# Patient Record
Sex: MALE | Race: WHITE | NOT HISPANIC OR LATINO | Employment: FULL TIME | URBAN - METROPOLITAN AREA
[De-identification: names, ages, dates, MRNs, and addresses within clinical notes are randomized per-mention and may not be internally consistent; named-entity substitution may affect disease eponyms.]

---

## 2020-07-11 ENCOUNTER — HOSPITAL ENCOUNTER (INPATIENT)
Facility: HOSPITAL | Age: 50
LOS: 2 days | Discharge: HOME | DRG: 660 | End: 2020-07-13
Attending: INTERNAL MEDICINE | Admitting: INTERNAL MEDICINE
Payer: COMMERCIAL

## 2020-07-11 ENCOUNTER — HOSPITAL ENCOUNTER (EMERGENCY)
Facility: HOSPITAL | Age: 50
End: 2020-07-11
Attending: EMERGENCY MEDICINE | Admitting: EMERGENCY MEDICINE
Payer: COMMERCIAL

## 2020-07-11 ENCOUNTER — APPOINTMENT (EMERGENCY)
Dept: CT IMAGING | Facility: HOSPITAL | Age: 50
End: 2020-07-11
Payer: COMMERCIAL

## 2020-07-11 ENCOUNTER — APPOINTMENT (EMERGENCY)
Dept: ULTRASOUND IMAGING | Facility: HOSPITAL | Age: 50
End: 2020-07-11
Payer: COMMERCIAL

## 2020-07-11 VITALS
HEIGHT: 71 IN | TEMPERATURE: 97.5 F | BODY MASS INDEX: 41.3 KG/M2 | RESPIRATION RATE: 18 BRPM | OXYGEN SATURATION: 95 % | DIASTOLIC BLOOD PRESSURE: 82 MMHG | WEIGHT: 295 LBS | HEART RATE: 84 BPM | SYSTOLIC BLOOD PRESSURE: 164 MMHG

## 2020-07-11 DIAGNOSIS — I86.1 BILATERAL VARICOCELES: ICD-10-CM

## 2020-07-11 DIAGNOSIS — N20.0 NEPHROLITHIASIS: Primary | ICD-10-CM

## 2020-07-11 DIAGNOSIS — N20.0 NEPHROLITHIASIS: ICD-10-CM

## 2020-07-11 DIAGNOSIS — R31.9 HEMATURIA: ICD-10-CM

## 2020-07-11 DIAGNOSIS — D72.829 LEUKOCYTOSIS, UNSPECIFIED TYPE: ICD-10-CM

## 2020-07-11 DIAGNOSIS — R10.9 LEFT FLANK PAIN: Primary | ICD-10-CM

## 2020-07-11 DIAGNOSIS — R11.2 NON-INTRACTABLE VOMITING WITH NAUSEA, UNSPECIFIED VOMITING TYPE: ICD-10-CM

## 2020-07-11 DIAGNOSIS — R79.89 ELEVATED LACTIC ACID LEVEL: ICD-10-CM

## 2020-07-11 DIAGNOSIS — R16.0 HEPATOMEGALY: ICD-10-CM

## 2020-07-11 DIAGNOSIS — N50.812 LEFT TESTICULAR PAIN: ICD-10-CM

## 2020-07-11 DIAGNOSIS — R74.01 ELEVATED TRANSAMINASE LEVEL: ICD-10-CM

## 2020-07-11 PROBLEM — F11.21 OPIOID DEPENDENCE IN REMISSION (HCC): Status: ACTIVE | Noted: 2020-07-11

## 2020-07-11 PROBLEM — I16.0 HYPERTENSIVE URGENCY: Status: ACTIVE | Noted: 2020-07-11

## 2020-07-11 PROBLEM — N17.9 ACUTE KIDNEY INJURY (HCC): Status: ACTIVE | Noted: 2020-07-11

## 2020-07-11 LAB
ALBUMIN SERPL BCP-MCNC: 4.1 G/DL (ref 3.5–5)
ALP SERPL-CCNC: 165 U/L (ref 46–116)
ALT SERPL W P-5'-P-CCNC: 83 U/L (ref 12–78)
ANION GAP SERPL CALCULATED.3IONS-SCNC: 11 MMOL/L (ref 4–13)
AST SERPL W P-5'-P-CCNC: 60 U/L (ref 5–45)
BACTERIA UR QL AUTO: ABNORMAL /HPF
BASOPHILS # BLD AUTO: 0.03 THOUSANDS/ΜL (ref 0–0.1)
BASOPHILS NFR BLD AUTO: 0 % (ref 0–1)
BILIRUB SERPL-MCNC: 0.51 MG/DL (ref 0.2–1)
BILIRUB UR QL STRIP: NEGATIVE
BUN SERPL-MCNC: 18 MG/DL (ref 5–25)
CALCIUM SERPL-MCNC: 8 MG/DL (ref 8.3–10.1)
CHLORIDE SERPL-SCNC: 103 MMOL/L (ref 100–108)
CLARITY UR: CLEAR
CO2 SERPL-SCNC: 26 MMOL/L (ref 21–32)
COLOR UR: ABNORMAL
CREAT SERPL-MCNC: 1.39 MG/DL (ref 0.6–1.3)
EOSINOPHIL # BLD AUTO: 0.03 THOUSAND/ΜL (ref 0–0.61)
EOSINOPHIL NFR BLD AUTO: 0 % (ref 0–6)
ERYTHROCYTE [DISTWIDTH] IN BLOOD BY AUTOMATED COUNT: 13.3 % (ref 11.6–15.1)
GFR SERPL CREATININE-BSD FRML MDRD: 60 ML/MIN/1.73SQ M
GLUCOSE SERPL-MCNC: 176 MG/DL (ref 65–140)
GLUCOSE UR STRIP-MCNC: ABNORMAL MG/DL
HCT VFR BLD AUTO: 42.3 % (ref 36.5–49.3)
HGB BLD-MCNC: 14 G/DL (ref 12–17)
HGB UR QL STRIP.AUTO: ABNORMAL
IMM GRANULOCYTES # BLD AUTO: 0.04 THOUSAND/UL (ref 0–0.2)
IMM GRANULOCYTES NFR BLD AUTO: 0 % (ref 0–2)
KETONES UR STRIP-MCNC: NEGATIVE MG/DL
LACTATE SERPL-SCNC: 2.5 MMOL/L (ref 0.5–2)
LEUKOCYTE ESTERASE UR QL STRIP: NEGATIVE
LIPASE SERPL-CCNC: 109 U/L (ref 73–393)
LYMPHOCYTES # BLD AUTO: 0.86 THOUSANDS/ΜL (ref 0.6–4.47)
LYMPHOCYTES NFR BLD AUTO: 8 % (ref 14–44)
MAGNESIUM SERPL-MCNC: 1.9 MG/DL (ref 1.6–2.6)
MCH RBC QN AUTO: 26.5 PG (ref 26.8–34.3)
MCHC RBC AUTO-ENTMCNC: 33.1 G/DL (ref 31.4–37.4)
MCV RBC AUTO: 80 FL (ref 82–98)
MONOCYTES # BLD AUTO: 0.83 THOUSAND/ΜL (ref 0.17–1.22)
MONOCYTES NFR BLD AUTO: 8 % (ref 4–12)
NEUTROPHILS # BLD AUTO: 8.91 THOUSANDS/ΜL (ref 1.85–7.62)
NEUTS SEG NFR BLD AUTO: 84 % (ref 43–75)
NITRITE UR QL STRIP: NEGATIVE
NON-SQ EPI CELLS URNS QL MICRO: ABNORMAL /HPF
NRBC BLD AUTO-RTO: 0 /100 WBCS
PH UR STRIP.AUTO: 7 [PH]
PLATELET # BLD AUTO: 215 THOUSANDS/UL (ref 149–390)
PMV BLD AUTO: 9.1 FL (ref 8.9–12.7)
POTASSIUM SERPL-SCNC: 4.1 MMOL/L (ref 3.5–5.3)
PROT SERPL-MCNC: 7.4 G/DL (ref 6.4–8.2)
PROT UR STRIP-MCNC: NEGATIVE MG/DL
RBC # BLD AUTO: 5.28 MILLION/UL (ref 3.88–5.62)
RBC #/AREA URNS AUTO: ABNORMAL /HPF
SODIUM SERPL-SCNC: 140 MMOL/L (ref 136–145)
SP GR UR STRIP.AUTO: 1.02 (ref 1–1.03)
UROBILINOGEN UR QL STRIP.AUTO: 0.2 E.U./DL
WBC # BLD AUTO: 10.7 THOUSAND/UL (ref 4.31–10.16)
WBC #/AREA URNS AUTO: ABNORMAL /HPF

## 2020-07-11 PROCEDURE — 36415 COLL VENOUS BLD VENIPUNCTURE: CPT | Performed by: EMERGENCY MEDICINE

## 2020-07-11 PROCEDURE — 96365 THER/PROPH/DIAG IV INF INIT: CPT

## 2020-07-11 PROCEDURE — G0379 DIRECT REFER HOSPITAL OBSERV: HCPCS

## 2020-07-11 PROCEDURE — 83735 ASSAY OF MAGNESIUM: CPT | Performed by: EMERGENCY MEDICINE

## 2020-07-11 PROCEDURE — 76870 US EXAM SCROTUM: CPT

## 2020-07-11 PROCEDURE — 99285 EMERGENCY DEPT VISIT HI MDM: CPT

## 2020-07-11 PROCEDURE — 99220 PR INITIAL OBSERVATION CARE/DAY 70 MINUTES: CPT | Performed by: INTERNAL MEDICINE

## 2020-07-11 PROCEDURE — 96361 HYDRATE IV INFUSION ADD-ON: CPT

## 2020-07-11 PROCEDURE — 83605 ASSAY OF LACTIC ACID: CPT | Performed by: EMERGENCY MEDICINE

## 2020-07-11 PROCEDURE — 99285 EMERGENCY DEPT VISIT HI MDM: CPT | Performed by: EMERGENCY MEDICINE

## 2020-07-11 PROCEDURE — 80053 COMPREHEN METABOLIC PANEL: CPT | Performed by: EMERGENCY MEDICINE

## 2020-07-11 PROCEDURE — 83690 ASSAY OF LIPASE: CPT | Performed by: EMERGENCY MEDICINE

## 2020-07-11 PROCEDURE — 74177 CT ABD & PELVIS W/CONTRAST: CPT

## 2020-07-11 PROCEDURE — 85025 COMPLETE CBC W/AUTO DIFF WBC: CPT | Performed by: EMERGENCY MEDICINE

## 2020-07-11 PROCEDURE — 81001 URINALYSIS AUTO W/SCOPE: CPT | Performed by: EMERGENCY MEDICINE

## 2020-07-11 PROCEDURE — 96375 TX/PRO/DX INJ NEW DRUG ADDON: CPT

## 2020-07-11 RX ORDER — ONDANSETRON 4 MG/1
4 TABLET, FILM COATED ORAL EVERY 6 HOURS
Qty: 12 TABLET | Refills: 0 | Status: CANCELLED | OUTPATIENT
Start: 2020-07-11

## 2020-07-11 RX ORDER — TAMSULOSIN HYDROCHLORIDE 0.4 MG/1
0.4 CAPSULE ORAL
Status: DISCONTINUED | OUTPATIENT
Start: 2020-07-11 | End: 2020-07-13 | Stop reason: HOSPADM

## 2020-07-11 RX ORDER — SODIUM CHLORIDE 9 MG/ML
200 INJECTION, SOLUTION INTRAVENOUS CONTINUOUS
Status: DISCONTINUED | OUTPATIENT
Start: 2020-07-11 | End: 2020-07-12

## 2020-07-11 RX ORDER — KETOROLAC TROMETHAMINE 30 MG/ML
15 INJECTION, SOLUTION INTRAMUSCULAR; INTRAVENOUS EVERY 6 HOURS PRN
Status: DISCONTINUED | OUTPATIENT
Start: 2020-07-11 | End: 2020-07-12

## 2020-07-11 RX ORDER — BUPRENORPHINE AND NALOXONE 8; 2 MG/1; MG/1
1 FILM, SOLUBLE BUCCAL; SUBLINGUAL DAILY
Status: DISCONTINUED | OUTPATIENT
Start: 2020-07-12 | End: 2020-07-13 | Stop reason: HOSPADM

## 2020-07-11 RX ORDER — ACETAMINOPHEN 325 MG/1
650 TABLET ORAL ONCE
Status: COMPLETED | OUTPATIENT
Start: 2020-07-11 | End: 2020-07-11

## 2020-07-11 RX ORDER — KETOROLAC TROMETHAMINE 30 MG/ML
15 INJECTION, SOLUTION INTRAMUSCULAR; INTRAVENOUS EVERY 6 HOURS SCHEDULED
Status: DISCONTINUED | OUTPATIENT
Start: 2020-07-11 | End: 2020-07-11

## 2020-07-11 RX ORDER — ACETAMINOPHEN 325 MG/1
650 TABLET ORAL EVERY 6 HOURS SCHEDULED
Status: DISCONTINUED | OUTPATIENT
Start: 2020-07-11 | End: 2020-07-13 | Stop reason: HOSPADM

## 2020-07-11 RX ORDER — KETOROLAC TROMETHAMINE 30 MG/ML
30 INJECTION, SOLUTION INTRAMUSCULAR; INTRAVENOUS ONCE
Status: COMPLETED | OUTPATIENT
Start: 2020-07-11 | End: 2020-07-11

## 2020-07-11 RX ORDER — PANTOPRAZOLE SODIUM 40 MG/1
40 TABLET, DELAYED RELEASE ORAL DAILY
COMMUNITY

## 2020-07-11 RX ORDER — CEFTRIAXONE 1 G/50ML
1000 INJECTION, SOLUTION INTRAVENOUS ONCE
Status: COMPLETED | OUTPATIENT
Start: 2020-07-11 | End: 2020-07-11

## 2020-07-11 RX ORDER — BUPRENORPHINE HYDROCHLORIDE AND NALOXONE HYDROCHLORIDE DIHYDRATE 8; 2 MG/1; MG/1
1 TABLET SUBLINGUAL DAILY
COMMUNITY

## 2020-07-11 RX ORDER — HYDROMORPHONE HCL/PF 1 MG/ML
1 SYRINGE (ML) INJECTION ONCE
Status: DISCONTINUED | OUTPATIENT
Start: 2020-07-11 | End: 2020-07-11 | Stop reason: HOSPADM

## 2020-07-11 RX ORDER — LABETALOL 20 MG/4 ML (5 MG/ML) INTRAVENOUS SYRINGE
10 ONCE
Status: COMPLETED | OUTPATIENT
Start: 2020-07-11 | End: 2020-07-11

## 2020-07-11 RX ORDER — ONDANSETRON 2 MG/ML
4 INJECTION INTRAMUSCULAR; INTRAVENOUS ONCE
Status: COMPLETED | OUTPATIENT
Start: 2020-07-11 | End: 2020-07-11

## 2020-07-11 RX ORDER — SODIUM CHLORIDE 9 MG/ML
150 INJECTION, SOLUTION INTRAVENOUS CONTINUOUS
Status: DISCONTINUED | OUTPATIENT
Start: 2020-07-11 | End: 2020-07-11

## 2020-07-11 RX ORDER — HYDRALAZINE HYDROCHLORIDE 20 MG/ML
10 INJECTION INTRAMUSCULAR; INTRAVENOUS ONCE
Status: DISCONTINUED | OUTPATIENT
Start: 2020-07-11 | End: 2020-07-11 | Stop reason: HOSPADM

## 2020-07-11 RX ORDER — TAMSULOSIN HYDROCHLORIDE 0.4 MG/1
0.4 CAPSULE ORAL ONCE
Status: COMPLETED | OUTPATIENT
Start: 2020-07-11 | End: 2020-07-11

## 2020-07-11 RX ORDER — PANTOPRAZOLE SODIUM 40 MG/1
40 TABLET, DELAYED RELEASE ORAL DAILY
Status: DISCONTINUED | OUTPATIENT
Start: 2020-07-12 | End: 2020-07-13 | Stop reason: HOSPADM

## 2020-07-11 RX ORDER — MORPHINE SULFATE 4 MG/ML
4 INJECTION, SOLUTION INTRAMUSCULAR; INTRAVENOUS ONCE
Status: DISCONTINUED | OUTPATIENT
Start: 2020-07-11 | End: 2020-07-11 | Stop reason: HOSPADM

## 2020-07-11 RX ORDER — ECHINACEA PURPUREA EXTRACT 125 MG
1 TABLET ORAL
Status: DISCONTINUED | OUTPATIENT
Start: 2020-07-11 | End: 2020-07-13 | Stop reason: HOSPADM

## 2020-07-11 RX ADMIN — ACETAMINOPHEN 650 MG: 325 TABLET, FILM COATED ORAL at 18:04

## 2020-07-11 RX ADMIN — TAMSULOSIN HYDROCHLORIDE 0.4 MG: 0.4 CAPSULE ORAL at 18:04

## 2020-07-11 RX ADMIN — IOHEXOL 100 ML: 350 INJECTION, SOLUTION INTRAVENOUS at 10:32

## 2020-07-11 RX ADMIN — ACETAMINOPHEN 650 MG: 325 TABLET ORAL at 11:10

## 2020-07-11 RX ADMIN — CEFTRIAXONE 1000 MG: 1 INJECTION, SOLUTION INTRAVENOUS at 11:11

## 2020-07-11 RX ADMIN — KETOROLAC TROMETHAMINE 30 MG: 30 INJECTION, SOLUTION INTRAMUSCULAR at 18:03

## 2020-07-11 RX ADMIN — SODIUM CHLORIDE 1000 ML: 0.9 INJECTION, SOLUTION INTRAVENOUS at 09:43

## 2020-07-11 RX ADMIN — LABETALOL 20 MG/4 ML (5 MG/ML) INTRAVENOUS SYRINGE 10 MG: at 11:28

## 2020-07-11 RX ADMIN — TAMSULOSIN HYDROCHLORIDE 0.4 MG: 0.4 CAPSULE ORAL at 11:10

## 2020-07-11 RX ADMIN — ONDANSETRON 4 MG: 2 INJECTION INTRAMUSCULAR; INTRAVENOUS at 09:50

## 2020-07-11 RX ADMIN — KETOROLAC TROMETHAMINE 30 MG: 30 INJECTION, SOLUTION INTRAMUSCULAR at 09:51

## 2020-07-11 RX ADMIN — SODIUM CHLORIDE 1000 ML: 0.9 INJECTION, SOLUTION INTRAVENOUS at 11:31

## 2020-07-11 RX ADMIN — SODIUM CHLORIDE 150 ML/HR: 0.9 INJECTION, SOLUTION INTRAVENOUS at 16:33

## 2020-07-11 NOTE — ASSESSMENT & PLAN NOTE
Suspect pain related  Patient presented ED with blood pressure 207/113 with pain med improved to 164/82  He was on lisinopril in the past which was discontinued because his blood pressure was well controlled  Continue to monitor

## 2020-07-11 NOTE — ASSESSMENT & PLAN NOTE
AST 60, ALT 83, phos 65  CT showed hepatomegaly with fatty infiltration  Patient denies right upper pain

## 2020-07-11 NOTE — PLAN OF CARE
Problem: PAIN - ADULT  Goal: Verbalizes/displays adequate comfort level or baseline comfort level  Description  Interventions:  - Encourage patient to monitor pain and request assistance  - Assess pain using appropriate pain scale  - Administer analgesics based on type and severity of pain and evaluate response  - Implement non-pharmacological measures as appropriate and evaluate response  - Consider cultural and social influences on pain and pain management  - Notify physician/advanced practitioner if interventions unsuccessful or patient reports new pain  Outcome: Progressing     Problem: INFECTION - ADULT  Goal: Absence or prevention of progression during hospitalization  Description  INTERVENTIONS:  - Assess and monitor for signs and symptoms of infection  - Monitor lab/diagnostic results  - Monitor all insertion sites, i e  indwelling lines, tubes, and drains  - Monitor endotracheal if appropriate and nasal secretions for changes in amount and color  - Riggins appropriate cooling/warming therapies per order  - Administer medications as ordered  - Instruct and encourage patient and family to use good hand hygiene technique  - Identify and instruct in appropriate isolation precautions for identified infection/condition  Outcome: Progressing  Goal: Absence of fever/infection during neutropenic period  Description  INTERVENTIONS:  - Monitor WBC    Outcome: Progressing     Problem: SAFETY ADULT  Goal: Patient will remain free of falls  Description  INTERVENTIONS:  - Assess patient frequently for physical needs  -  Identify cognitive and physical deficits and behaviors that affect risk of falls    -  Riggins fall precautions as indicated by assessment   - Educate patient/family on patient safety including physical limitations  - Instruct patient to call for assistance with activity based on assessment  - Modify environment to reduce risk of injury  - Consider OT/PT consult to assist with strengthening/mobility  Outcome: Progressing  Goal: Maintain or return to baseline ADL function  Description  INTERVENTIONS:  -  Assess patient's ability to carry out ADLs; assess patient's baseline for ADL function and identify physical deficits which impact ability to perform ADLs (bathing, care of mouth/teeth, toileting, grooming, dressing, etc )  - Assess/evaluate cause of self-care deficits   - Assess range of motion  - Assess patient's mobility; develop plan if impaired  - Assess patient's need for assistive devices and provide as appropriate  - Encourage maximum independence but intervene and supervise when necessary  - Involve family in performance of ADLs  - Assess for home care needs following discharge   - Consider OT consult to assist with ADL evaluation and planning for discharge  - Provide patient education as appropriate  Outcome: Progressing  Goal: Maintain or return mobility status to optimal level  Description  INTERVENTIONS:  - Assess patient's baseline mobility status (ambulation, transfers, stairs, etc )    - Identify cognitive and physical deficits and behaviors that affect mobility  - Identify mobility aids required to assist with transfers and/or ambulation (gait belt, sit-to-stand, lift, walker, cane, etc )  - [place] fall precautions as indicated by assessment  - Record patient progress and toleration of activity level on Mobility SBAR; progress patient to next Phase/Stage  - Instruct patient to call for assistance with activity based on assessment  - Consider rehabilitation consult to assist with strengthening/weightbearing, etc   Outcome: Progressing     Problem: DISCHARGE PLANNING  Goal: Discharge to home or other facility with appropriate resources  Description  INTERVENTIONS:  - Identify barriers to discharge w/patient and caregiver  - Arrange for needed discharge resources and transportation as appropriate  - Identify discharge learning needs (meds, wound care, etc )  - Arrange for interpretive services to assist at discharge as needed  - Refer to Case Management Department for coordinating discharge planning if the patient needs post-hospital services based on physician/advanced practitioner order or complex needs related to functional status, cognitive ability, or social support system  Outcome: Progressing     Problem: Knowledge Deficit  Goal: Patient/family/caregiver demonstrates understanding of disease process, treatment plan, medications, and discharge instructions  Description  Complete learning assessment and assess knowledge base    Interventions:  - Provide teaching at level of understanding  - Provide teaching via preferred learning methods  Outcome: Progressing

## 2020-07-11 NOTE — ASSESSMENT & PLAN NOTE
Patient has Suboxone, does not want a stop taking it  Refuse opioids  Requested to give a call to Dr Rhonda Phillips [phone] with managing Suboxone to discuss pain management    I left a voicemail

## 2020-07-11 NOTE — H&P
H&P- Enolia Pert 1970, 51 y o  male MRN: [id number]    Unit/Bed#: MS Dow7-01 Encounter: [id number]    Primary Care Provider: Mavis Art MD   Date and time admitted to hospital: 7/11/2020  2:47 PM        * Nephrolithiasis  Assessment & Plan  Patient presented with left groin pain that radiated to left flank pain  Transferred from [address] [place] [address] for urology evaluation  CT showed 2 adjacent calculi measuring 3 mm and 2 mm in the left distal ureter causing mild hydroureteronephrosis with perinephric stranding  Tiny nonobstructing right intrarenal calculus  Scrotal ultrasound done due to left scrotal pain  Normal testes, bilateral varicoceles  UA negative for leukocytes, nitrate or bacteria, showed trace blood  Patient afebrile, white blood cell count 10  IV fluids  Tamsulosin  Patient on Suboxone and would not like to come off of it  Refuses opioids  Tylenol  Toradol 15 mg q 6 p r n , cautious due to acute kidney failure  Urology consult        Acute kidney injury [facility])  Assessment & Plan  Creatinine 1 39, in Care everywhere last creatinine level was were 0 89  Start IV fluids  Patient refuses opioids, for pain management started on Toradol q 6 hours p r n  He also received contrast  Repeat BMP in the morning    Opioid dependence in remission [facility])  Assessment & Plan  Patient has Suboxone, does not want a stop taking it  Refuse opioids  Requested to give a call to Dr Vinnie Libman [phone] with managing Suboxone to discuss pain management    I left a voicemail    Hypertensive urgency  Assessment & Plan  Suspect pain related  Patient presented ED with blood pressure 207/113 with pain med improved to 164/82  He was on lisinopril in the past which was discontinued because his blood pressure was well controlled  Continue to monitor    Transaminitis  Assessment & Plan  AST 60, ALT 83, phos 65  CT showed hepatomegaly with fatty infiltration  Patient denies right upper pain    VTE Prophylaxis: Patient ambulating / sequential compression device   Code Status:  Full code  POLST: There is no POLST form on file for this patient (pre-hospital)  Discussion with family:  Patient    Anticipated Length of Stay:  Patient will be admitted on an Observation basis with an anticipated length of stay of  less than 2 midnights  Justification for Hospital Stay:  Nephrolithiasis, needs urology evaluation    Total Time for Visit, including Counseling / Coordination of Care: 45 minutes  Greater than 50% of this total time spent on direct patient counseling and coordination of care  Chief Complaint:   Left flank pain    History of Present Illness:    Nehemias Erickson is a 51 y o  male who presents with left flank pain  Patient is a 46-year-old male with a past medical history of GERD, opiate dependence currently on Suboxone  Tuesday patient developed hematuria, called his PCP who said he is most likely dehydrated because he was working outside a lot  He was drinking more fluids and hematuria resolved  On Friday he developed left growing pain which was radiating in the left flank, sharp intermittent, rated 10/10, associated with difficulty with urination  He presented to [address] [place] [address] ED found to have on CT 2 adjacent calculi measuring 3 mm and 2 mm in the left distal ureter causing mild hydroureteronephrosis  He was transferred to [place] for urology evaluation  Patient is on Suboxone, refusing to stop Suboxone  Refuses opioids  Denies fever, chills, nausea vomiting diarrhea constipation, chest pain shortness of breath or palpitation  Review of Systems:    Review of Systems all reviewed and negative except as above    Past Medical and Surgical History:     Past Medical History:   Diagnosis Date    Back pain     Chronic GERD        Patient had low back surgery and neck surgery in the past  Family history noncontributory    Meds/Allergies:    Prior to Admission medications    Medication Sig Start Date End Date Taking?  Authorizing Provider   buprenorphine-naloxone (SUBOXONE) 8-2 mg per SL tablet Place 1 tablet under the tongue daily   Yes Historical Provider, MD   pantoprazole (PROTONIX) 40 mg tablet Take 40 mg by mouth daily   Yes Historical Provider, MD     I have reviewed home medications with patient personally  Allergies: No Known Allergies    Social History:     Marital Status: Single   Substance Use History:   Social History     Substance and Sexual Activity   Alcohol Use Never    Frequency: Never     Social History     Tobacco Use   Smoking Status Unknown If Ever Smoked   Smokeless Tobacco Never Used     Social History     Substance and Sexual Activity   Drug Use Never       Family History:    Denies any family history    Physical Exam:     Vitals:        Physical Exam   Constitutional: He is oriented to person, place, and time  Obese, no acute distress   HENT:   Head: Atraumatic  Eyes: EOM are normal    Neck: Neck supple  Cardiovascular: Normal rate, regular rhythm and normal heart sounds  Exam reveals no friction rub  No murmur heard  Pulmonary/Chest: Effort normal and breath sounds normal  No respiratory distress  He has no wheezes  Abdominal: Soft  Bowel sounds are normal  He exhibits no distension  Left groin tenderness, no left CVA tenderness   Musculoskeletal: He exhibits no edema  Neurological: He is alert and oriented to person, place, and time  No cranial nerve deficit  Skin: Skin is warm and dry  Psychiatric: His behavior is normal        Additional Data:     Lab Results: I have personally reviewed pertinent reports        Results from last 7 days   Lab Units 07/11/20  0942   WBC Thousand/uL 10 70*   HEMOGLOBIN g/dL 14 0   HEMATOCRIT % 42 3   PLATELETS Thousands/uL 215   NEUTROS PCT % 84*   LYMPHS PCT % 8*   MONOS PCT % 8   EOS PCT % 0     Results from last 7 days   Lab Units 07/11/20  0942   SODIUM mmol/L 140   POTASSIUM mmol/L 4 1   CHLORIDE mmol/L 103   CO2 mmol/L 26   BUN mg/dL 18   CREATININE mg/dL 1 39*   ANION GAP mmol/L 11   CALCIUM mg/dL 8 0*   ALBUMIN g/dL 4 1   TOTAL BILIRUBIN mg/dL 0 51   ALK PHOS U/L 165*   ALT U/L 83*   AST U/L 60*   GLUCOSE RANDOM mg/dL 176*                 Results from last 7 days   Lab Units 07/11/20  0942   LACTIC ACID mmol/L 2 5*       Imaging: I have personally reviewed pertinent reports  No orders to display       EKG, Pathology, and Other Studies Reviewed on Admission:   · EKG:  Not done    [facility]     ** Please Note: This note has been constructed using a voice recognition system   **

## 2020-07-11 NOTE — ED NOTES
Report to [facility]  Ems here for transport  Pt still declining narcotics, as he is trying to contact his "suboxone doctor" in [place] prior to accepting any pain meds        Ann Jeff RN  07/11/20 [id number]

## 2020-07-11 NOTE — ASSESSMENT & PLAN NOTE
Creatinine 1 39, in Care everywhere last creatinine level was were 0 89  Start IV fluids  Patient refuses opioids, for pain management started on Toradol q 6 hours p r n    He also received contrast  Repeat BMP in the morning

## 2020-07-11 NOTE — ED PROVIDER NOTES
History  Chief Complaint   Patient presents with    Abdominal Pain     LLQ pain radiating to L groin  Urinary frequency  Onset yest     60-year-old male with a past medical history of GERD, chronic low back pain status post L5-S1 fusion presents with left flank pain with associated nausea and vomiting since last night  Patient states his pain is primarily in the left flank and radiates to the left low back and to the left lower quadrant and into the left testicle  Patient has never had pain like this before and denies history of nephrolithiasis, hernia, diverticulitis or pyelonephritis  Patient denies history of AAA  Denies trauma, fall or exertional activity  Patient had one episode of nonbloody nonbilious emesis this morning  Patient is on Suboxone due to past oxycodone addiction and did not take any pain medication at home for his symptoms  Patient denies fever, chills, cough, sputum production, headache, rash, chest pain, shortness of breath, bloody stools, urinary symptoms, diarrhea, constipation, penile discharge  Patient denies history of STDs, cancer, IV drug use  Patient denies saddle anesthesia, urinary retention, urinary fecal incontinence  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 wore PPE during clinical evaluation including Bonnet, eye goggles, face mask and gloves        History provided by:  Patient   used: No    Flank Pain   Pain location:  L flank  Pain quality: pressure and stabbing    Pain radiates to:  Scrotum, LLQ and back  Pain severity:  Severe  Onset quality:  Gradual  Duration:  14 hours  Timing:  Intermittent  Progression:  Waxing and waning  Chronicity:  New  Context: awakening from sleep    Context: not alcohol use, not diet changes, not eating, not laxative use, not medication withdrawal, not previous surgeries, not recent illness, not recent sexual activity, not recent travel, not retching, not sick contacts, not suspicious food intake and not trauma    Relieved by:  Nothing  Worsened by:  Nothing  Ineffective treatments:  None tried  Associated symptoms: nausea and vomiting    Associated symptoms: no anorexia, no belching, no chest pain, no chills, no constipation, no cough, no diarrhea, no dysuria, no fatigue, no fever, no flatus, no hematemesis, no hematochezia, no hematuria, no melena, no shortness of breath and no sore throat    Risk factors: obesity    Risk factors: no alcohol abuse, no aspirin use, not elderly, has not had multiple surgeries, no NSAID use and no recent hospitalization        Prior to Admission Medications   Prescriptions Last Dose Informant Patient Reported? Taking? buprenorphine-naloxone (SUBOXONE) 8-2 mg per SL tablet  Self Yes Yes   Sig: Place 1 tablet under the tongue daily   pantoprazole (PROTONIX) 40 mg tablet  Self Yes Yes   Sig: Take 40 mg by mouth daily      Facility-Administered Medications: None       Past Medical History:   Diagnosis Date    Back pain     Chronic GERD        History reviewed  No pertinent surgical history  No family history on file  I have reviewed and agree with the history as documented  E-Cigarette/Vaping     E-Cigarette/Vaping Substances     Social History     Tobacco Use    Smoking status: Unknown If Ever Smoked    Smokeless tobacco: Never Used   Substance Use Topics    Alcohol use: Never     Frequency: Never    Drug use: Never       Review of Systems   Constitutional: Negative for chills, diaphoresis, fatigue and fever  HENT: Negative for congestion, drooling, facial swelling, nosebleeds, sneezing, sore throat, trouble swallowing and voice change  Eyes: Negative for photophobia, pain and visual disturbance  Respiratory: Negative for cough, chest tightness, shortness of breath and wheezing  Cardiovascular: Negative for chest pain, palpitations and leg swelling  Gastrointestinal: Positive for abdominal pain, nausea and vomiting   Negative for anorexia, constipation, diarrhea, flatus, hematemesis, hematochezia and melena  Genitourinary: Positive for flank pain and testicular pain  Negative for decreased urine volume, difficulty urinating, discharge, dysuria, frequency, genital sores, hematuria, penile pain, penile swelling, scrotal swelling and urgency  Musculoskeletal: Negative for arthralgias, back pain, joint swelling, myalgias, neck pain and neck stiffness  Skin: Negative for color change, pallor, rash and wound  Allergic/Immunologic: Negative for immunocompromised state  Neurological: Negative for dizziness, tremors, seizures, syncope, facial asymmetry, speech difficulty, weakness, light-headedness, numbness and headaches  Hematological: Negative for adenopathy  Psychiatric/Behavioral: Negative for agitation, confusion, hallucinations and suicidal ideas  The patient is not nervous/anxious  Physical Exam  Physical Exam   Constitutional: He is oriented to person, place, and time  He appears well-developed and well-nourished  He is cooperative  Non-toxic appearance  He does not have a sickly appearance  He appears ill  No distress  Appears uncomfortable   HENT:   Head: Normocephalic and atraumatic  Right Ear: Hearing, tympanic membrane, external ear and ear canal normal    Left Ear: Hearing, tympanic membrane, external ear and ear canal normal    Nose: Nose normal  Right sinus exhibits no maxillary sinus tenderness and no frontal sinus tenderness  Left sinus exhibits no maxillary sinus tenderness and no frontal sinus tenderness  Mouth/Throat: Uvula is midline, oropharynx is clear and moist and mucous membranes are normal  No oropharyngeal exudate, posterior oropharyngeal edema, posterior oropharyngeal erythema or tonsillar abscesses  Eyes: Pupils are equal, round, and reactive to light  Conjunctivae, EOM and lids are normal    Neck: Trachea normal, normal range of motion, full passive range of motion without pain and phonation normal  Neck supple  No thyroid mass and no thyromegaly present  Cardiovascular: Normal rate, regular rhythm, S1 normal, S2 normal, normal heart sounds, intact distal pulses and normal pulses  Pulses:       Radial pulses are 2+ on the right side, and 2+ on the left side  Dorsalis pedis pulses are 2+ on the right side, and 2+ on the left side  Pulmonary/Chest: Effort normal and breath sounds normal  No accessory muscle usage or stridor  No tachypnea  No respiratory distress  He has no decreased breath sounds  He has no wheezes  He has no rhonchi  He has no rales  He exhibits no mass, no tenderness, no deformity and no retraction  Abdominal: Soft  Bowel sounds are normal  He exhibits no distension, no ascites and no mass  There is no hepatosplenomegaly, splenomegaly or hepatomegaly  There is tenderness in the suprapubic area and left lower quadrant  There is CVA tenderness (Left-sided)  There is no rigidity, no rebound, no guarding, no tenderness at McBurney's point and negative Bustillo's sign  No hernia  Hernia confirmed negative in the ventral area, confirmed negative in the right inguinal area and confirmed negative in the left inguinal area  Genitourinary: Penis normal  Cremasteric reflex is present  Right testis shows no mass, no swelling and no tenderness  Right testis is descended  Cremasteric reflex is not absent on the right side  Left testis shows tenderness  Left testis shows no mass and no swelling  Left testis is descended  Cremasteric reflex is not absent on the left side  Circumcised  No phimosis, paraphimosis, hypospadias, penile erythema or penile tenderness  No discharge found  Genitourinary Comments: Mild tenderness to left testicle; no swelling or erythema; normal lie of testicles bilaterally, positive cremasteric reflex   Musculoskeletal: Normal range of motion  He exhibits no edema, tenderness or deformity  Lymphadenopathy:     He has no cervical adenopathy  No inguinal adenopathy noted on the right or left side     Neurological: He is alert and oriented to person, place, and time  He has normal strength  He is not disoriented  He displays no atrophy and no tremor  No cranial nerve deficit or sensory deficit  He exhibits normal muscle tone  He displays a negative Romberg sign  He displays no seizure activity  Coordination and gait normal  GCS eye subscore is 4  GCS verbal subscore is 5  GCS motor subscore is 6  Patient is AAOx4, GCS 15; speaking clearly and appropriately; motor and sensation intact; visual fields intact; cranial nerves II-XII grossly intact; no facial droop, slurred speech or arm drift   Skin: Skin is warm and intact  Capillary refill takes less than 2 seconds  No abrasion, no bruising, no burn, no ecchymosis, no laceration, no lesion, no petechiae and no rash noted  Rash is not maculopapular  He is diaphoretic  No cyanosis or erythema  No pallor  Psychiatric: His speech is normal and behavior is normal  Judgment and thought content normal  His mood appears anxious  He is not actively hallucinating  Cognition and memory are normal  He is attentive  Nursing note and vitals reviewed        Vital Signs  ED Triage Vitals [07/11/20 0940]   Temperature Pulse Respirations Blood Pressure SpO2   97 5 °F (36 4 °C) 73 20 (!) 207/113 100 %      Temp Source Heart Rate Source Patient Position - Orthostatic VS BP Location FiO2 (%)   Temporal Monitor -- -- --      Pain Score       Worst Possible Pain           Vitals:    07/11/20 1115 07/11/20 1130 07/11/20 1157 07/11/20 1300   BP: (!) 187/104 (!) 194/100 166/86 164/82   Pulse: 82 65 80 84         Visual Acuity      ED Medications  Medications   ondansetron (ZOFRAN) injection 4 mg (4 mg Intravenous Given 7/11/20 0950)   ketorolac (TORADOL) injection 30 mg (30 mg Intravenous Given 7/11/20 0951)   sodium chloride 0 9 % bolus 1,000 mL (0 mL Intravenous Stopped 7/11/20 1315)   iohexol (OMNIPAQUE) 350 MG/ML injection (SINGLE-DOSE) 100 mL (100 mL Intravenous Given 7/11/20 1032)   acetaminophen (TYLENOL) tablet 650 mg (650 mg Oral Given 7/11/20 1110)   Labetalol HCl (NORMODYNE) injection 10 mg (10 mg Intravenous Given 7/11/20 1128)   sodium chloride 0 9 % bolus 1,000 mL (0 mL Intravenous Stopped 7/11/20 1315)   cefTRIAXone (ROCEPHIN) IVPB (premix) 1,000 mg 50 mL (0 mg Intravenous Stopped 7/11/20 1136)   tamsulosin (FLOMAX) capsule 0 4 mg (0 4 mg Oral Given 7/11/20 1110)       Diagnostic Studies  Results Reviewed     Procedure Component Value Units Date/Time    Lactic acid, plasma [[id number]]  (Abnormal) Collected:  07/11/20 0942    Lab Status:  Final result Specimen:  Blood from Arm, Left Updated:  07/11/20 1017     LACTIC ACID 2 5 mmol/L     Narrative:       Result may be elevated if tourniquet was used during collection      CMP [[id number]]  (Abnormal) Collected:  07/11/20 0942    Lab Status:  Final result Specimen:  Blood from Arm, Left Updated:  07/11/20 1008     Sodium 140 mmol/L      Potassium 4 1 mmol/L      Chloride 103 mmol/L      CO2 26 mmol/L      ANION GAP 11 mmol/L      BUN 18 mg/dL      Creatinine 1 39 mg/dL      Glucose 176 mg/dL      Calcium 8 0 mg/dL      AST 60 U/L      ALT 83 U/L      Alkaline Phosphatase 165 U/L      Total Protein 7 4 g/dL      Albumin 4 1 g/dL      Total Bilirubin 0 51 mg/dL      eGFR 60 ml/min/1 73sq m     Narrative:       National Kidney Disease Foundation guidelines for Chronic Kidney Disease (CKD):     Stage 1 with normal or high GFR (GFR > 90 mL/min/1 73 square meters)    Stage 2 Mild CKD (GFR = 60-89 mL/min/1 73 square meters)    Stage 3A Moderate CKD (GFR = 45-59 mL/min/1 73 square meters)    Stage 3B Moderate CKD (GFR = 30-44 mL/min/1 73 square meters)    Stage 4 Severe CKD (GFR = 15-29 mL/min/1 73 square meters)    Stage 5 End Stage CKD (GFR <15 mL/min/1 73 square meters)  Note: GFR calculation is accurate only with a steady state creatinine    Lipase [[id number]]  (Normal) Collected:  07/11/20 0942    Lab Status:  Final result Specimen:  Blood from Arm, Left Updated:  07/11/20 1008     Lipase 109 u/L     Magnesium [[id number]]  (Normal) Collected:  07/11/20 0942    Lab Status:  Final result Specimen:  Blood from Arm, Left Updated:  07/11/20 1008     Magnesium 1 9 mg/dL     Urine Microscopic [[id number]]  (Abnormal) Collected:  07/11/20 0946    Lab Status:  Final result Specimen:  Urine, Clean Catch Updated:  07/11/20 1008     RBC, UA 1-2 /hpf      WBC, UA None Seen /hpf      Epithelial Cells None Seen /hpf      Bacteria, UA None Seen /hpf     UA w Reflex to Microscopic w Reflex to Culture [[id number]]  (Abnormal) Collected:  07/11/20 0946    Lab Status:  Final result Specimen:  Urine, Clean Catch Updated:  07/11/20 0959     Color, UA Straw     Clarity, UA Clear     Specific [place], UA 1 020     pH, UA 7 0     Leukocytes, UA Negative     Nitrite, UA Negative     Protein, UA Negative mg/dl      Glucose,  (1/4%) mg/dl      Ketones, UA Negative mg/dl      Urobilinogen, UA 0 2 E U /dl      Bilirubin, UA Negative     Blood, UA Trace-Intact    CBC and differential [[id number]]  (Abnormal) Collected:  07/11/20 0942    Lab Status:  Final result Specimen:  Blood from Arm, Left Updated:  07/11/20 0947     WBC 10 70 Thousand/uL      RBC 5 28 Million/uL      Hemoglobin 14 0 g/dL      Hematocrit 42 3 %      MCV 80 fL      MCH 26 5 pg      MCHC 33 1 g/dL      RDW 13 3 %      MPV 9 1 fL      Platelets 651 Thousands/uL      nRBC 0 /100 WBCs      Neutrophils Relative 84 %      Immat GRANS % 0 %      Lymphocytes Relative 8 %      Monocytes Relative 8 %      Eosinophils Relative 0 %      Basophils Relative 0 %      Neutrophils Absolute 8 91 Thousands/µL      Immature Grans Absolute 0 04 Thousand/uL      Lymphocytes Absolute 0 86 Thousands/µL      Monocytes Absolute 0 83 Thousand/µL      Eosinophils Absolute 0 03 Thousand/µL      Basophils Absolute 0 03 Thousands/µL                  US scrotum and testicles   Final Result by Blaire Riley MD (07/11 1206)          1  Normal testes  2   Bilateral varicoceles  Workstation performed: [id number]         CT Abdomen pelvis with contrast   Final Result by Michael Corbin MD (07/11 1052)      Two adjacent calculi measuring 3 mm and 2 mm in the left distal ureter causing mild hydroureteronephrosis with perinephric stranding  Tiny nonobstructing right intrarenal calculus  Hepatomegaly with fatty infiltration  Workstation performed: [id number]                    Procedures  Procedures         ED Course  ED Course as of Jul 11 1858   Sat Jul 11, 2020   0956 Left voicemail for [employer] tech, Keri [address], cell [phone] for US scrotum/testicles  1005 Attempted call to [place], no answer  1013 Creatinine(!): 1 39   1014 Creatinine 1 39 today, previous 0 89 on 10/18/19      1033 Reassessed patient  He states his left flank pain is still 10/10 after Toradol  Due to his Suboxone usage, patient is refusing morphine or Dilaudid  Offered patient Tylenol or Bentyl  Will not give more Toradol as patient has elevated creatinine which he was not aware of       [facility] is at bedside  1054 CTAP:IMPRESSION:     Two adjacent calculi measuring 3 mm and 2 mm in the left distal ureter causing mild hydroureteronephrosis with perinephric stranding      Tiny nonobstructing right intrarenal calculus      Hepatomegaly with fatty infiltration                1059 Texted Dr Alona Golden, Urology regarding patient's symptoms and CTAP imaging  [address] [facility] Dr Alona Golden texted that patient can be transferred to [facility] for further evaluation if pain not well controlled  Keep NPO  No emergent intervention planned at this time  [address] patient  He is still complaining of 8/10 left flank pain after medication  Discussed option for transfer to [facility] for medicine admission and Urology consult with Dr Alona Golden  Patient would like to be transferred at this time  PACS contacted  Evy Magallanes with Angelo Ruiz at [employer]   She will call back with Hospitalist at [address] Spoke with Dr Sintia Jiang, Hospitalist at [facility]  She accepts patient to [place] inpatient for nephrolithiasis  Aware that Dr Jac Starks, Urology has been contacted and patient is to stay NPO  EMTALA completed and given to nurse  Awaiting transfer time  [id number] EMS here for transport to [facility]  US AUDIT      Most Recent Value   Initial Alcohol Screen: US AUDIT-C    1   How often do you have a drink containing alcohol?  0 Filed at: 07/11/2020 0936   Audit-C Score  0 Filed at: 07/11/2020 6571        MDM  Number of Diagnoses or Management Options  Bilateral varicoceles:   Elevated lactic acid level:   Elevated transaminase level:   Hematuria:   Hepatomegaly:   Left flank pain:   Left testicular pain:   Leukocytosis, unspecified type:   Nephrolithiasis:   Non-intractable vomiting with nausea, unspecified vomiting type:      Amount and/or Complexity of Data Reviewed  Clinical lab tests: reviewed and ordered  Tests in the radiology section of CPT®: reviewed and ordered  Tests in the medicine section of CPT®: ordered and reviewed  Review and summarize past medical records: yes  Discuss the patient with other providers: yes (Urology, Dr Jac Starks  PACS  [facility], Hospitalist Dr Sintia Jiang)  Independent visualization of images, tracings, or specimens: yes (CT abdomen pelvis, ultrasound scrotum testicles)    Risk of Complications, Morbidity, and/or Mortality  Presenting problems: moderate  Diagnostic procedures: moderate  Management options: moderate    Patient Progress  Patient progress: stable        Disposition  Final diagnoses:   Left flank pain   Non-intractable vomiting with nausea, unspecified vomiting type   Hematuria   Elevated lactic acid level   Elevated transaminase level   Left testicular pain   Leukocytosis, unspecified type   Bilateral varicoceles   Hepatomegaly   Nephrolithiasis     Time reflects when diagnosis was documented in both MDM as applicable and the Disposition within this note     Time User Action Codes Description Comment    7/11/2020 10:04 AM Miguelito Sang Add [R10 9] Left flank pain     7/11/2020 10:05 AM Miguelito Sang Add [R11 2] Non-intractable vomiting with nausea, unspecified vomiting type     7/11/2020 10:14 AM Miguelito Sang Add [R31 9] Hematuria     7/11/2020 10:34 AM Miguelito Sang Add [R79 89] Elevated lactic acid level     7/11/2020 10:34 AM Miguelito Sang Add [R74 0] Elevated transaminase level     7/11/2020  6:53 PM Miguelito Sang Add [N50 812] Left testicular pain     7/11/2020  6:54 PM Miguelito Sang Add [D72 829] Leukocytosis, unspecified type     7/11/2020  6:54 PM Miguelito Sang Add [I86 1] Bilateral varicoceles     7/11/2020  6:54 PM Miguelito Sang Add [R16 0] Hepatomegaly     7/11/2020  6:55 PM Miguelito Sang Add [N20 0] Nephrolithiasis       ED Disposition     ED Disposition Condition Date/Time Comment    Transfer to Another Facility-In Network  [facility] Jul 11, 2020 11:27 AM Briseida Coburn should be transferred out to B          MD Documentation      Most Recent Value   Patient Condition  The patient has been stabilized such that within reasonable medical probability, no material deterioration of the patient condition or the condition of the unborn child(jamel) is likely to result from the transfer   Reason for Transfer  Level of Care needed not available at this facility, Patient/Family request, No bed available at level of patient's needs   Benefits of Transfer  Specialized equipment and/or services available at the receiving facility (Include comment)________________________, Continuity of care, Patient preference   Risks of Transfer  Potential for delay in receiving treatment, Potential deterioration of medical condition, Loss of IV, Increased discomfort during transfer, Possible worsening of condition or death during transfer   Accepting Physician  Dr Zac Morse Name, [facility]   [facility]   Sending MD Gurpreet Richards MD   Provider Certification  General risk, such as traffic hazards, adverse weather conditions, rough terrain or turbulence, possible failure of equipment (including vehicle or aircraft), or consequences of actions of persons outside the control of the transport personnel, Unanticipated needs of medical equipment and personnel during transport, Risk of worsening condition, The possibility of a transport vehicle being unavailable      RN Documentation      Most Recent Value   [facility]   Transport Mode  Ambulance      Follow-up Information     Follow up With Specialties Details Why Contact Info    Manuj Lee MD Internal Medicine Call in 1 day As needed, If symptoms worsen [address]  [address] [facility]  [address] [facility]      Mandi Broderick MD Urology Call in 1 day As needed, If symptoms worsen [address]  [phone]            Discharge Medication List as of 7/11/2020  1:38 PM      CONTINUE these medications which have NOT CHANGED    Details   buprenorphine-naloxone (SUBOXONE) 8-2 mg per SL tablet Place 1 tablet under the tongue daily, Historical Med      pantoprazole (PROTONIX) 40 mg tablet Take 40 mg by mouth daily, Historical Med           No discharge procedures on file      PDMP Review     None          ED Provider  Electronically Signed by      MD Ildefonso Newman MD  07/11/20 4716

## 2020-07-11 NOTE — ED NOTES
Pt returned from ct scan  States toradol  "helped" w/ pain while in ct scan, but now has risen again    Dr Tristan Adjutant at bedside, discussing pain med options, as pt is declining narcotics, r/t current  suboxone tx     Jalil Leo, RN  07/11/20 1085

## 2020-07-11 NOTE — EMTALA/ACUTE CARE TRANSFER
[address] [place], [place] [address]  [facility] [address]  Dept: [phone]      EMTALA TRANSFER CONSENT    NAME Pedro Bueno                                         1970                              MRN [id number]    I have been informed of my rights regarding examination, treatment, and transfer   by Dr Low Kebede MD    Benefits: Specialized equipment and/or services available at the receiving facility (Include comment)________________________, Continuity of care, Patient preference    Risks: Potential for delay in receiving treatment, Potential deterioration of medical condition, Loss of IV, Increased discomfort during transfer, Possible worsening of condition or death during transfer      Transfer Request   I acknowledge that my medical condition has been evaluated and explained to me by the emergency department physician or other qualified medical person and/or my attending physician who has recommended and offered to me further medical examination and treatment  I understand the Hospital's obligation with respect to the treatment and stabilization of my emergency medical condition  I nevertheless request to be transferred  I release the Hospital, the doctor, and any other persons caring for me from all responsibility or liability for any injury or ill effects that may result from my transfer and agree to accept all responsibility for the consequences of my choice to transfer, rather than receive stabilizing treatment at the Hospital  I understand that because the transfer is my request, my insurance may not provide reimbursement for the services  The Hospital will assist and direct me and my family in how to make arrangements for transfer, but the hospital is not liable for any fees charged by the transport service    In spite of this understanding, I refuse to consent to further medical examination and treatment which has been offered to me, and request transfer to [address] [place] [address] Rd Name, [address] : [facility]  I authorize the performance of emergency medical procedures and treatments upon me in both transit and upon arrival at the receiving facility  Additionally, I authorize the release of any and all medical records to the receiving facility and request they be transported with me, if possible  I authorize the performance of emergency medical procedures and treatments upon me in both transit and upon arrival at the receiving facility  Additionally, I authorize the release of any and all medical records to the receiving facility and request they be transported with me, if possible  I understand that the safest mode of transportation during a medical emergency is an ambulance and that the Hospital advocates the use of this mode of transport  Risks of traveling to the receiving facility by car, including absence of medical control, life sustaining equipment, such as oxygen, and medical personnel has been explained to me and I fully understand them  (ROSINA CORRECT BOX BELOW)  [  ]  I consent to the stated transfer and to be transported by ambulance/helicopter  [  ]  I consent to the stated transfer, but refuse transportation by ambulance and accept full responsibility for my transportation by car  I understand the risks of non-ambulance transfers and I exonerate the Hospital and its staff from any deterioration in my condition that results from this refusal     X___________________________________________    DATE  20  TIME________  Signature of patient or legally responsible individual signing on patient behalf           RELATIONSHIP TO PATIENT_________________________          Provider Certification    NAME Myah Hagen                                         1970                              MRN [id number]    A medical screening exam was performed on the above named patient    Based on the examination:    Condition Necessitating Transfer The primary encounter diagnosis was Left flank pain  Diagnoses of Non-intractable vomiting with nausea, unspecified vomiting type, Hematuria, Elevated lactic acid level, and Elevated transaminase level were also pertinent to this visit  Patient Condition: The patient has been stabilized such that within reasonable medical probability, no material deterioration of the patient condition or the condition of the unborn child(jamel) is likely to result from the transfer    Reason for Transfer: Level of Care needed not available at this facility, Patient/Family request, No bed available at level of patient's needs    Transfer Requirements: Facility hospitals   · Space available and qualified personnel available for treatment as acknowledged by    · Agreed to accept transfer and to provide appropriate medical treatment as acknowledged by       Dr Zi Haile  · Appropriate medical records of the examination and treatment of the patient are provided at the time of transfer   [address] [facility], [address] _______  · Transfer will be performed by qualified personnel from    and appropriate transfer equipment as required, including the use of necessary and appropriate life support measures      Provider Certification: I have examined the patient and explained the following risks and benefits of being transferred/refusing transfer to the patient/family:  General risk, such as traffic hazards, adverse weather conditions, rough terrain or turbulence, possible failure of equipment (including vehicle or aircraft), or consequences of actions of persons outside the control of the transport personnel, Unanticipated needs of medical equipment and personnel during transport, Risk of worsening condition, The possibility of a transport vehicle being unavailable      Based on these reasonable risks and benefits to the patient and/or the unborn child(jamel), and based upon the information available at the time of the patients examination, I certify that the medical benefits reasonably to be expected from the provision of appropriate medical treatments at another medical facility outweigh the increasing risks, if any, to the individuals medical condition, and in the case of labor to the unborn child, from effecting the transfer      X____________________________________________ DATE 07/11/20        TIME_______      ORIGINAL - SEND TO MEDICAL RECORDS   COPY - SEND WITH PATIENT DURING TRANSFER

## 2020-07-11 NOTE — ASSESSMENT & PLAN NOTE
Patient presented with left groin pain that radiated to left flank pain  Transferred from [address] [place] [address] for urology evaluation  CT showed 2 adjacent calculi measuring 3 mm and 2 mm in the left distal ureter causing mild hydroureteronephrosis with perinephric stranding  Tiny nonobstructing right intrarenal calculus  Scrotal ultrasound done due to left scrotal pain  Normal testes, bilateral varicoceles  UA negative for leukocytes, nitrate or bacteria, showed trace blood  Patient afebrile, white blood cell count 10  IV fluids  Tamsulosin  Patient on Suboxone and would not like to come off of it  Refuses opioids      Tylenol  Toradol 15 mg q 6 p r n , cautious due to acute kidney failure  Urology consult

## 2020-07-12 ENCOUNTER — ANESTHESIA EVENT (INPATIENT)
Dept: PERIOP | Facility: HOSPITAL | Age: 50
DRG: 660 | End: 2020-07-12
Payer: COMMERCIAL

## 2020-07-12 ENCOUNTER — ANESTHESIA (INPATIENT)
Dept: PERIOP | Facility: HOSPITAL | Age: 50
DRG: 660 | End: 2020-07-12
Payer: COMMERCIAL

## 2020-07-12 ENCOUNTER — TELEPHONE (OUTPATIENT)
Dept: UROLOGY | Facility: MEDICAL CENTER | Age: 50
End: 2020-07-12

## 2020-07-12 ENCOUNTER — APPOINTMENT (INPATIENT)
Dept: RADIOLOGY | Facility: HOSPITAL | Age: 50
DRG: 660 | End: 2020-07-12
Payer: COMMERCIAL

## 2020-07-12 LAB
ANION GAP SERPL CALCULATED.3IONS-SCNC: 5 MMOL/L (ref 4–13)
BASOPHILS # BLD AUTO: 0.04 THOUSANDS/ΜL (ref 0–0.1)
BASOPHILS NFR BLD AUTO: 1 % (ref 0–1)
BUN SERPL-MCNC: 16 MG/DL (ref 5–25)
CALCIUM SERPL-MCNC: 8.3 MG/DL (ref 8.3–10.1)
CHLORIDE SERPL-SCNC: 108 MMOL/L (ref 100–108)
CO2 SERPL-SCNC: 27 MMOL/L (ref 21–32)
CREAT SERPL-MCNC: 1.29 MG/DL (ref 0.6–1.3)
EOSINOPHIL # BLD AUTO: 0.07 THOUSAND/ΜL (ref 0–0.61)
EOSINOPHIL NFR BLD AUTO: 1 % (ref 0–6)
ERYTHROCYTE [DISTWIDTH] IN BLOOD BY AUTOMATED COUNT: 13.8 % (ref 11.6–15.1)
GFR SERPL CREATININE-BSD FRML MDRD: 65 ML/MIN/1.73SQ M
GLUCOSE P FAST SERPL-MCNC: 139 MG/DL (ref 65–99)
GLUCOSE SERPL-MCNC: 139 MG/DL (ref 65–140)
HCT VFR BLD AUTO: 37 % (ref 36.5–49.3)
HGB BLD-MCNC: 11.9 G/DL (ref 12–17)
IMM GRANULOCYTES # BLD AUTO: 0.03 THOUSAND/UL (ref 0–0.2)
IMM GRANULOCYTES NFR BLD AUTO: 0 % (ref 0–2)
LYMPHOCYTES # BLD AUTO: 0.92 THOUSANDS/ΜL (ref 0.6–4.47)
LYMPHOCYTES NFR BLD AUTO: 12 % (ref 14–44)
MCH RBC QN AUTO: 26.4 PG (ref 26.8–34.3)
MCHC RBC AUTO-ENTMCNC: 32.2 G/DL (ref 31.4–37.4)
MCV RBC AUTO: 82 FL (ref 82–98)
MONOCYTES # BLD AUTO: 0.96 THOUSAND/ΜL (ref 0.17–1.22)
MONOCYTES NFR BLD AUTO: 12 % (ref 4–12)
NEUTROPHILS # BLD AUTO: 5.75 THOUSANDS/ΜL (ref 1.85–7.62)
NEUTS SEG NFR BLD AUTO: 74 % (ref 43–75)
NRBC BLD AUTO-RTO: 0 /100 WBCS
PLATELET # BLD AUTO: 157 THOUSANDS/UL (ref 149–390)
PMV BLD AUTO: 9.3 FL (ref 8.9–12.7)
POTASSIUM SERPL-SCNC: 4.1 MMOL/L (ref 3.5–5.3)
RBC # BLD AUTO: 4.5 MILLION/UL (ref 3.88–5.62)
SARS-COV-2 RNA RESP QL NAA+PROBE: NEGATIVE
SODIUM SERPL-SCNC: 140 MMOL/L (ref 136–145)
WBC # BLD AUTO: 7.77 THOUSAND/UL (ref 4.31–10.16)

## 2020-07-12 PROCEDURE — 80048 BASIC METABOLIC PNL TOTAL CA: CPT | Performed by: INTERNAL MEDICINE

## 2020-07-12 PROCEDURE — 52351 CYSTOURETERO & OR PYELOSCOPE: CPT | Performed by: UROLOGY

## 2020-07-12 PROCEDURE — C1726 CATH, BAL DIL, NON-VASCULAR: HCPCS | Performed by: UROLOGY

## 2020-07-12 PROCEDURE — C1769 GUIDE WIRE: HCPCS | Performed by: UROLOGY

## 2020-07-12 PROCEDURE — BT1F1ZZ FLUOROSCOPY OF LEFT KIDNEY, URETER AND BLADDER USING LOW OSMOLAR CONTRAST: ICD-10-PCS | Performed by: UROLOGY

## 2020-07-12 PROCEDURE — 74420 UROGRAPHY RTRGR +-KUB: CPT

## 2020-07-12 PROCEDURE — 52332 CYSTOSCOPY AND TREATMENT: CPT | Performed by: UROLOGY

## 2020-07-12 PROCEDURE — 0T778DZ DILATION OF LEFT URETER WITH INTRALUMINAL DEVICE, VIA NATURAL OR ARTIFICIAL OPENING ENDOSCOPIC: ICD-10-PCS | Performed by: UROLOGY

## 2020-07-12 PROCEDURE — 99232 SBSQ HOSP IP/OBS MODERATE 35: CPT | Performed by: NURSE PRACTITIONER

## 2020-07-12 PROCEDURE — U0003 INFECTIOUS AGENT DETECTION BY NUCLEIC ACID (DNA OR RNA); SEVERE ACUTE RESPIRATORY SYNDROME CORONAVIRUS 2 (SARS-COV-2) (CORONAVIRUS DISEASE [COVID-19]), AMPLIFIED PROBE TECHNIQUE, MAKING USE OF HIGH THROUGHPUT TECHNOLOGIES AS DESCRIBED BY CMS-2020-01-R: HCPCS | Performed by: UROLOGY

## 2020-07-12 PROCEDURE — C2617 STENT, NON-COR, TEM W/O DEL: HCPCS | Performed by: UROLOGY

## 2020-07-12 PROCEDURE — 85025 COMPLETE CBC W/AUTO DIFF WBC: CPT | Performed by: INTERNAL MEDICINE

## 2020-07-12 PROCEDURE — 99255 IP/OBS CONSLTJ NEW/EST HI 80: CPT | Performed by: UROLOGY

## 2020-07-12 DEVICE — STENT URETERAL 6 FR 26CM INLAY OPTIMA: Type: IMPLANTABLE DEVICE | Site: URETER | Status: FUNCTIONAL

## 2020-07-12 RX ORDER — KETOROLAC TROMETHAMINE 30 MG/ML
15 INJECTION, SOLUTION INTRAMUSCULAR; INTRAVENOUS ONCE
Status: COMPLETED | OUTPATIENT
Start: 2020-07-12 | End: 2020-07-12

## 2020-07-12 RX ORDER — KETOROLAC TROMETHAMINE 30 MG/ML
30 INJECTION, SOLUTION INTRAMUSCULAR; INTRAVENOUS EVERY 6 HOURS PRN
Status: DISCONTINUED | OUTPATIENT
Start: 2020-07-12 | End: 2020-07-13 | Stop reason: HOSPADM

## 2020-07-12 RX ORDER — FENTANYL CITRATE 50 UG/ML
INJECTION, SOLUTION INTRAMUSCULAR; INTRAVENOUS AS NEEDED
Status: DISCONTINUED | OUTPATIENT
Start: 2020-07-12 | End: 2020-07-12 | Stop reason: SURG

## 2020-07-12 RX ORDER — MAGNESIUM HYDROXIDE 1200 MG/15ML
LIQUID ORAL AS NEEDED
Status: DISCONTINUED | OUTPATIENT
Start: 2020-07-12 | End: 2020-07-12 | Stop reason: HOSPADM

## 2020-07-12 RX ORDER — SUCCINYLCHOLINE/SOD CL,ISO/PF 100 MG/5ML
SYRINGE (ML) INTRAVENOUS AS NEEDED
Status: DISCONTINUED | OUTPATIENT
Start: 2020-07-12 | End: 2020-07-12 | Stop reason: SURG

## 2020-07-12 RX ORDER — GLYCOPYRROLATE 0.2 MG/ML
INJECTION INTRAMUSCULAR; INTRAVENOUS AS NEEDED
Status: DISCONTINUED | OUTPATIENT
Start: 2020-07-12 | End: 2020-07-12 | Stop reason: SURG

## 2020-07-12 RX ORDER — SODIUM CHLORIDE, SODIUM LACTATE, POTASSIUM CHLORIDE, CALCIUM CHLORIDE 600; 310; 30; 20 MG/100ML; MG/100ML; MG/100ML; MG/100ML
INJECTION, SOLUTION INTRAVENOUS CONTINUOUS PRN
Status: DISCONTINUED | OUTPATIENT
Start: 2020-07-12 | End: 2020-07-12 | Stop reason: SURG

## 2020-07-12 RX ORDER — LIDOCAINE HYDROCHLORIDE 10 MG/ML
INJECTION, SOLUTION EPIDURAL; INFILTRATION; INTRACAUDAL; PERINEURAL AS NEEDED
Status: DISCONTINUED | OUTPATIENT
Start: 2020-07-12 | End: 2020-07-12 | Stop reason: SURG

## 2020-07-12 RX ORDER — ROCURONIUM BROMIDE 10 MG/ML
INJECTION, SOLUTION INTRAVENOUS AS NEEDED
Status: DISCONTINUED | OUTPATIENT
Start: 2020-07-12 | End: 2020-07-12 | Stop reason: SURG

## 2020-07-12 RX ORDER — NEOSTIGMINE METHYLSULFATE 1 MG/ML
INJECTION INTRAVENOUS AS NEEDED
Status: DISCONTINUED | OUTPATIENT
Start: 2020-07-12 | End: 2020-07-12 | Stop reason: SURG

## 2020-07-12 RX ORDER — HYDROMORPHONE HCL/PF 1 MG/ML
0.2 SYRINGE (ML) INJECTION
Status: DISCONTINUED | OUTPATIENT
Start: 2020-07-12 | End: 2020-07-12

## 2020-07-12 RX ORDER — ONDANSETRON 2 MG/ML
INJECTION INTRAMUSCULAR; INTRAVENOUS AS NEEDED
Status: DISCONTINUED | OUTPATIENT
Start: 2020-07-12 | End: 2020-07-12 | Stop reason: SURG

## 2020-07-12 RX ORDER — PROPOFOL 10 MG/ML
INJECTION, EMULSION INTRAVENOUS AS NEEDED
Status: DISCONTINUED | OUTPATIENT
Start: 2020-07-12 | End: 2020-07-12 | Stop reason: SURG

## 2020-07-12 RX ORDER — SODIUM CHLORIDE, SODIUM LACTATE, POTASSIUM CHLORIDE, CALCIUM CHLORIDE 600; 310; 30; 20 MG/100ML; MG/100ML; MG/100ML; MG/100ML
20 INJECTION, SOLUTION INTRAVENOUS CONTINUOUS
Status: DISCONTINUED | OUTPATIENT
Start: 2020-07-12 | End: 2020-07-13 | Stop reason: HOSPADM

## 2020-07-12 RX ORDER — FENTANYL CITRATE/PF 50 MCG/ML
25 SYRINGE (ML) INJECTION
Status: DISCONTINUED | OUTPATIENT
Start: 2020-07-12 | End: 2020-07-12

## 2020-07-12 RX ORDER — KETOROLAC TROMETHAMINE 30 MG/ML
INJECTION, SOLUTION INTRAMUSCULAR; INTRAVENOUS AS NEEDED
Status: DISCONTINUED | OUTPATIENT
Start: 2020-07-12 | End: 2020-07-12 | Stop reason: SURG

## 2020-07-12 RX ORDER — ONDANSETRON 2 MG/ML
4 INJECTION INTRAMUSCULAR; INTRAVENOUS ONCE AS NEEDED
Status: DISCONTINUED | OUTPATIENT
Start: 2020-07-12 | End: 2020-07-12

## 2020-07-12 RX ADMIN — KETOROLAC TROMETHAMINE 15 MG: 30 INJECTION, SOLUTION INTRAMUSCULAR at 15:09

## 2020-07-12 RX ADMIN — SODIUM CHLORIDE, SODIUM LACTATE, POTASSIUM CHLORIDE, AND CALCIUM CHLORIDE: .6; .31; .03; .02 INJECTION, SOLUTION INTRAVENOUS at 13:33

## 2020-07-12 RX ADMIN — KETOROLAC TROMETHAMINE 15 MG: 30 INJECTION, SOLUTION INTRAMUSCULAR at 12:40

## 2020-07-12 RX ADMIN — LIDOCAINE HYDROCHLORIDE 50 MG: 10 INJECTION, SOLUTION EPIDURAL; INFILTRATION; INTRACAUDAL; PERINEURAL at 13:36

## 2020-07-12 RX ADMIN — ACETAMINOPHEN 650 MG: 325 TABLET, FILM COATED ORAL at 06:01

## 2020-07-12 RX ADMIN — Medication 1 SPRAY: at 00:15

## 2020-07-12 RX ADMIN — ROCURONIUM BROMIDE 20 MG: 10 INJECTION, SOLUTION INTRAVENOUS at 13:52

## 2020-07-12 RX ADMIN — ACETAMINOPHEN 650 MG: 325 TABLET, FILM COATED ORAL at 12:15

## 2020-07-12 RX ADMIN — ROCURONIUM BROMIDE 20 MG: 10 INJECTION, SOLUTION INTRAVENOUS at 14:04

## 2020-07-12 RX ADMIN — PROPOFOL 300 MG: 10 INJECTION, EMULSION INTRAVENOUS at 13:36

## 2020-07-12 RX ADMIN — TAMSULOSIN HYDROCHLORIDE 0.4 MG: 0.4 CAPSULE ORAL at 17:17

## 2020-07-12 RX ADMIN — NEOSTIGMINE METHYLSULFATE 3 MG: 1 INJECTION, SOLUTION INTRAVENOUS at 14:42

## 2020-07-12 RX ADMIN — ACETAMINOPHEN 650 MG: 325 TABLET, FILM COATED ORAL at 17:16

## 2020-07-12 RX ADMIN — FENTANYL CITRATE 50 MCG: 50 INJECTION, SOLUTION INTRAMUSCULAR; INTRAVENOUS at 14:04

## 2020-07-12 RX ADMIN — SODIUM CHLORIDE 200 ML/HR: 0.9 INJECTION, SOLUTION INTRAVENOUS at 03:24

## 2020-07-12 RX ADMIN — Medication 200 MG: at 13:37

## 2020-07-12 RX ADMIN — Medication 3000 MG: at 13:58

## 2020-07-12 RX ADMIN — ONDANSETRON 4 MG: 2 INJECTION INTRAMUSCULAR; INTRAVENOUS at 14:42

## 2020-07-12 RX ADMIN — FENTANYL CITRATE 50 MCG: 50 INJECTION, SOLUTION INTRAMUSCULAR; INTRAVENOUS at 13:38

## 2020-07-12 RX ADMIN — PANTOPRAZOLE SODIUM 40 MG: 40 TABLET, DELAYED RELEASE ORAL at 09:53

## 2020-07-12 RX ADMIN — GLYCOPYRROLATE 0.4 MG: 0.2 INJECTION, SOLUTION INTRAMUSCULAR; INTRAVENOUS at 14:42

## 2020-07-12 RX ADMIN — ACETAMINOPHEN 650 MG: 325 TABLET, FILM COATED ORAL at 00:14

## 2020-07-12 NOTE — UTILIZATION REVIEW
Initial Clinical Review    Admission: Date/Time/Statement: Admission Orders (From admission, onward)     Ordered        07/11/20 1540  Place in Observation  Once                   Orders Placed This Encounter   Procedures    Place in Observation     Standing Status:   Standing     Number of Occurrences:   1     Order Specific Question:   Admitting Physician     Answer:   Allison Tatum [[id number]]     Order Specific Question:   Level of Care     Answer:   Med Surg [16]     ED Arrival Information     Patient not seen in ED -- tx from [address] [place] [address] ED                     Chief complaint: L flank pain     Assessment/Plan: 38 y/o male with a PMHx of GERD, opiate dependence currently on Suboxone, who presented initially to [address] [place] [address] with hematuria  Pt states Tuesday he developed hematuria, called his PCP who said he is most likely dehydrated because he was working outside a lot  He was drinking more fluids and hematuria resolved  On Friday he developed left groin pain which radiated to his L flank, sharp intermittent, rated 10/10, a/w difficulty with urination  He presented to [address] [place] [address] ED found to have on CT 2 adjacent calculi measuring 3 mm and 2 mm in the left distal ureter causing mild hydroureteronephrosis  He was transferred to [facility] for urology eval and further tx  Patient is on Suboxone, refusing to stop Suboxone  Refuses opioids  UA neg, scrotal US wnl  Admit observation with nephrolithiasis  Continue IVF's, tamsulosin  Tylenol  toradol prn  Repeat BMP in AM  Npo after MN  Urology consulted  OPERATIVE REPORT  SURGERY DATE: 7/12/2020  Procedure(s) (LRB):  CYSTOSCOPY URETEROSCOPY , RETROGRADE PYELOGRAM AND INSERTION STENT URETERAL (Left)  Anesthesia Type:   Choice  Operative Findings:  Normal urethra with predominantly bilobar prostatic hypertrophy causing partial obstruction  Ureteral orifices are normal   The bladder is mildly trabeculated    There are no stones, tumors or diverticula in the bladder  Preliminary fluoroscopic images were unremarkable  Left retrograde pyelography did not reveal any definite stone  Ureteroscopy was performed up to the ureterovesical junction but at this point there is a tight stricture that did not permit further examination of the collecting system  Attempts to balloon dilate the area were not successful  A stent was left in place to allow for healing and for soft dilation  The stent should remain in place for approximately 10 days to 2 weeks  A CT scan can be obtained to see if the stones passed spontaneously around the stent prior to considering repeat ureteroscopy  ED Triage Vitals   Temperature Pulse Respirations Blood Pressure SpO2   07/11/20 1504 07/11/20 1504 07/11/20 1504 07/11/20 1504 07/11/20 1504   98 °F (36 7 °C) 77 18 155/95 98 %      Temp Source Heart Rate Source Patient Position - Orthostatic VS BP Location FiO2 (%)   07/11/20 2218 -- -- -- --   Oral          Pain Score       07/11/20 1519       5        Wt Readings from Last 1 Encounters:   07/11/20 134 kg (295 lb)     Additional Vital Signs:   Vital Signs (last 2 days)     Date/Time  Temp  Pulse  Resp  BP  MAP (mmHg)  SpO2   07/12/20 07:46:13  98 2 °F (36 8 °C)  --  16  144/89  107  --   07/11/20 2218  98 1 °F (36 7 °C)  80  18  142/85  --  99 %   07/11/20 1504  98 °F (36 7 °C)  77  18  155/95  --  98 %       Pertinent Labs/Diagnostic Test Results:   CT a/p 7/11 -- Two adjacent calculi measuring 3 mm and 2 mm in the left distal ureter causing mild hydroureteronephrosis with perinephric stranding  Tiny nonobstructing right intrarenal calculus  Hepatomegaly with fatty infiltration  US scrotum and testicle 7/11 -- 1   Normal testes  2   Bilateral varicoceles         Results from last 7 days   Lab Units 07/12/20  0746 07/11/20  0942   WBC Thousand/uL 7 77 10 70*   HEMOGLOBIN g/dL 11 9* 14 0   HEMATOCRIT % 37 0 42 3   PLATELETS Thousands/uL 157 215   NEUTROS ABS Thousands/µL 5 75 8 91*     Results from last 7 days   Lab Units 07/12/20  0746 07/11/20  0942   SODIUM mmol/L 140 140   POTASSIUM mmol/L 4 1 4 1   CHLORIDE mmol/L 108 103   CO2 mmol/L 27 26   ANION GAP mmol/L 5 11   BUN mg/dL 16 18   CREATININE mg/dL 1 29 1 39*   EGFR ml/min/1 73sq m 65 60   CALCIUM mg/dL 8 3 8 0*   MAGNESIUM mg/dL  --  1 9     Results from last 7 days   Lab Units 07/11/20  0942   AST U/L 60*   ALT U/L 83*   ALK PHOS U/L 165*   TOTAL PROTEIN g/dL 7 4   ALBUMIN g/dL 4 1   TOTAL BILIRUBIN mg/dL 0 51     Results from last 7 days   Lab Units 07/12/20  0746 07/11/20  0942   GLUCOSE RANDOM mg/dL 139 176*     Results from last 7 days   Lab Units 07/11/20  0942   LACTIC ACID mmol/L 2 5*     Results from last 7 days   Lab Units 07/11/20  0942   LIPASE u/L 109     Results from last 7 days   Lab Units 07/11/20  0946   CLARITY UA  Clear   COLOR UA  Straw   SPEC GRAV UA  1 020   PH UA  7 0   GLUCOSE UA mg/dl 250 (1/4%)*   KETONES UA mg/dl Negative   BLOOD UA  Trace-Intact*   PROTEIN UA mg/dl Negative   NITRITE UA  Negative   BILIRUBIN UA  Negative   UROBILINOGEN UA E U /dl 0 2   LEUKOCYTES UA  Negative   WBC UA /hpf None Seen   RBC UA /hpf 1-2*   BACTERIA UA /hpf None Seen   EPITHELIAL CELLS WET PREP /hpf None Seen       Past Medical History:   Diagnosis Date    Back pain     Chronic GERD      Present on Admission:  **None**      Admitting Diagnosis: Nephrolithiasis [N20 0]  Age/Sex: 51 y o  male  Admission Orders:  Scheduled Medications:  Medications:  acetaminophen 650 mg Oral Q6H [address]   buprenorphine-naloxone 1 Film Sublingual Daily   pantoprazole 40 mg Oral Daily   tamsulosin 0 4 mg Oral Daily With Dinner     sodium chloride 0 9 % infusion   Rate: 200 mL/hr Dose: 200 mL/hr  Freq: Continuous Route: IV  Start: 07/11/20 1700 End: 07/12/20 0804     PRN Meds:  sodium chloride 1 spray Each Nare Q3H PRN       IP CONSULT TO UROLOGY    Network Utilization Review Department  [email or web address]  ATTENTION: Please call with any questions or concerns to [phone] and carefully listen to the prompts so that you are directed to the right person  All voicemails are confidential   Natasha Lerner all requests for admission clinical reviews, approved or denied determinations and any other requests to dedicated fax number below belonging to the campus where the patient is receiving treatment   List of dedicated fax numbers for the Facilities:  [address] [place] [address] DENIALS (Administrative/Medical Necessity) [phone]   [address] (Maternity/NICU/Pediatrics) [phone]   Danica Pineda [phone]   [facility] [phone]   Mireille Loachapoka [phone]   Karenann Deal [phone]   [address] [place] [address] [phone]   [facility]  [phone]   [address] [facility] [address]  [address] [facility] [address] [facility] [phone]

## 2020-07-12 NOTE — H&P (VIEW-ONLY)
Consultation - Urology   Anai Shepherd 51 y o  male MRN: [id number]  Unit/Bed#: PATTI FORTUNE Encounter: [id number] 7/12/2020           Assessment/Plan      Assessment:  Distal left ureteral stones  Plan:  Treatment options discussed with the patient  We will proceed with cystoscopy, left retrograde pyelography and left ureteroscopy  The procedure was described in detail  Risks were discussed and consent form signed  History of Present Illness   52-year-old male who initially developed gross hematuria last week  He then noted the onset of severe left flank pain  The pain radiated to his left lower quadrant  He had nausea and vomiting but no fever  The patient presented to the emergency room where he was found to have distal left ureteral stones  He remains symptomatic and was admitted for pain control  Urology is now consulted for further evaluation therapy  At the present time he is feeling a little better  He is voiding well  He denies dysuria  He has no fever  Attending: Shannon Ontiveros MD  Reason for Consult / Principal Problem:  Patient Active Problem List   Diagnosis    Nephrolithiasis    Transaminitis    Hypertensive urgency    Acute kidney injury ([facility] [address] )    Opioid dependence in remission [facility])       Inpatient consult to Urology  Consult performed by: Juani Templeton MD  Consult ordered by: Shannon Ontiveros MD          Review of Systems   Constitutional: Negative for chills, diaphoresis, fatigue and fever  HENT: Negative  Eyes: Negative  Respiratory: Negative  Cardiovascular: Negative  Endocrine: Negative  Genitourinary: Positive for flank pain  See HPI   Skin: Negative  Allergic/Immunologic: Negative  Neurological: Negative  Hematological: Negative  Psychiatric/Behavioral: Negative  Historical Information   No Known Allergies  Past Medical History:   Diagnosis Date    Back pain     Chronic GERD      History reviewed   No pertinent surgical history  Social History   Social History     Substance and Sexual Activity   Alcohol Use Never    Frequency: Never     Social History     Substance and Sexual Activity   Drug Use Never     Social History     Tobacco Use   Smoking Status Unknown If Ever Smoked   Smokeless Tobacco Never Used     Family History: non-contributory    Meds/Allergies   all current active meds have been reviewed    Current Facility-Administered Medications:     [MAR Hold] acetaminophen (TYLENOL) tablet 650 mg, 650 mg, Oral, Q6H [facility], Cyndi Edge MD, 650 mg at 07/12/20 1215    [MAR Hold] buprenorphine-naloxone (SUBOXONE) 8-2 mg per SL film 1 Film, 1 Film, Sublingual, Daily, Cyndi Edge MD    [MAR Hold] pantoprazole (PROTONIX) EC tablet 40 mg, 40 mg, Oral, Daily, Cyndi Edge MD, 40 mg at 07/12/20 0953    [MAR Hold] sodium chloride (OCEAN) 0 65 % nasal spray 1 spray, 1 spray, Each Nare, Q3H PRN, Martine Rodríguez PA-C, 1 spray at 07/12/20 0015    [MAR Hold] tamsulosin (FLOMAX) capsule 0 4 mg, 0 4 mg, Oral, Daily With Marce Bui MD, 0 4 mg at 07/11/20 1804    Current Facility-Administered Medications:  [MAR Hold] acetaminophen 650 mg Oral Q6H Karsten Seay MD   [MAR Hold] buprenorphine-naloxone 1 Film Sublingual Daily Cyndi Edge MD   [MAR Hold] pantoprazole 40 mg Oral Daily Cyndi Edge MD   [MAR Hold] sodium chloride 1 spray Each Nare Q3H PRN Martine Rodríguez PA-C   [MAR Hold] tamsulosin 0 4 mg Oral Daily With Marce Bui MD     [facility] Hold] sodium chloride       Objective   Vitals: Blood pressure 144/89, pulse 80, temperature 98 2 °F (36 8 °C), resp  rate 16, SpO2 99 %  I/O last 24 hours: In: 1600 [I V :1600]  Out: 1115 [Urine:1115]    Invasive Devices     Peripheral Intravenous Line            Peripheral IV 07/11/20 Left Antecubital 1 day                Physical Exam   Constitutional: He is oriented to person, place, and time   He appears well-developed and well-nourished  HENT:   Head: Normocephalic and atraumatic  Eyes: Conjunctivae are normal    Neck: Neck supple  Cardiovascular: Normal rate  Pulmonary/Chest: Effort normal    Abdominal: He exhibits no distension and no mass  There is no tenderness  There is no rebound and no guarding  No hernia   Genitourinary: Penis normal    Genitourinary Comments: Testes descended and normal to palpation   Neurological: He is alert and oriented to person, place, and time  Skin: Skin is warm and dry  Psychiatric: He has a normal mood and affect  His behavior is normal  Judgment and thought content normal    Vitals reviewed  Lab Results:   I have personally reviewed pertinent reports  CBC:   Lab Results   Component Value Date    WBC 7 77 07/12/2020    HGB 11 9 (L) 07/12/2020    HCT 37 0 07/12/2020    MCV 82 07/12/2020     07/12/2020    MCH 26 4 (L) 07/12/2020    MCHC 32 2 07/12/2020    RDW 13 8 07/12/2020    MPV 9 3 07/12/2020    NRBC 0 07/12/2020     CMP:   Lab Results   Component Value Date    SODIUM 140 07/12/2020     07/12/2020    CO2 27 07/12/2020    BUN 16 07/12/2020    CREATININE 1 29 07/12/2020    CALCIUM 8 3 07/12/2020    EGFR 65 07/12/2020     Urinalysis: No results found for: [place], SPECGRAV, PHUR, LEUKOCYTESUR, NITRITE, PROTEINUA, GLUCOSEU, KETONESU, BILIRUBINUR, BLOODU    Imaging Studies: I have personally reviewed pertinent films in PACS  Us Scrotum And Testicles    Result Date: 7/11/2020  Narrative: SCROTAL ULTRASOUND INDICATION:    left scrotal pain  COMPARISON: CT 7/11/2020  TECHNIQUE:   Ultrasound the scrotal contents was performed with a high frequency linear transducer utilizing volumetric sweep imaging as well as standard still image techniques  Imaging performed in longitudinal and transverse orientation  Color and spectral Doppler evaluation also performed bilaterally  FINDINGS: TESTES: Testes are symmetric and normal in size   RIGHT testis = 4 5 x 3 4 x 3 1 cm Normal contour with homogeneous smooth echotexture  No intratesticular mass lesion or calcifications  LEFT testis = 4 0 x 2 4 x 3 1 cm Normal contour with homogeneous smooth echotexture  No intratesticular mass lesion or calcifications  Doppler flow within both testes is present and symmetric  EPIDIDYMIDES: Normal Size  Doppler ultrasound demonstrates normal blood flow  No epididymal lesions  HYDROCELE:  No significant fluid present  VARICOCELE:  Bilateral varicoceles are present  SCROTUM:  Scrotal thickness and appearance within normal limits  No evidence for extratesticular mass or hernia demonstrated  Impression:  1  Normal testes  2   Bilateral varicoceles  Workstation performed: [id number]     Ct Abdomen Pelvis With Contrast    Result Date: 7/11/2020  Narrative: CT ABDOMEN AND PELVIS WITH IV CONTRAST INDICATION:   Abdominal pain, acute, nonlocalized  COMPARISON:  None  TECHNIQUE:  CT examination of the abdomen and pelvis was performed  Axial, sagittal, and coronal 2D reformatted images were created from the source data and submitted for interpretation  Radiation dose length product (DLP) for this visit:  1591 mGy-cm   This examination, like all CT scans performed in the [facility], was performed utilizing techniques to minimize radiation dose exposure, including the use of iterative reconstruction and automated exposure control  IV Contrast:  100 mL of iohexol (OMNIPAQUE) Enteric Contrast:  Enteric contrast was not administered  FINDINGS: ABDOMEN LOWER CHEST:  No clinically significant abnormality identified in the visualized lower chest  LIVER/BILIARY TREE:  Liver is enlarged with diffusely decreased density consistent with fatty change  No CT evidence of suspicious hepatic mass  Normal hepatic contours  No biliary dilatation  GALLBLADDER:  No calcified gallstones  No pericholecystic inflammatory change  SPLEEN:  Unremarkable  PANCREAS:  Unremarkable  ADRENAL GLANDS:  Unremarkable  KIDNEYS/URETERS:  There is mild left hydroureteronephrosis to the level of 2 adjacent calculi in the distal ureter, measuring 3 mm and 2 mm, approximately 2 cm above the uterovesical junction  There is moderate perinephric stranding  There is a punctate calculus in the interpolar right kidney, which is otherwise unremarkable  STOMACH AND BOWEL:  Unremarkable  APPENDIX:  No findings to suggest appendicitis  ABDOMINOPELVIC CAVITY:  No ascites  No pneumoperitoneum  No lymphadenopathy  VESSELS:  Unremarkable for patient's age  PELVIS REPRODUCTIVE ORGANS:  Unremarkable for patient's age  URINARY BLADDER:  Unremarkable  ABDOMINAL WALL/INGUINAL REGIONS:  There is a small fat-containing umbilical hernia  A 4 1 cm intramuscular lipoma is noted within the visualized inferior portion of the left latissimus musculature in the posterior lower chest  OSSEOUS STRUCTURES:  No acute fracture or destructive osseous lesion  Impression: Two adjacent calculi measuring 3 mm and 2 mm in the left distal ureter causing mild hydroureteronephrosis with perinephric stranding  Tiny nonobstructing right intrarenal calculus  Hepatomegaly with fatty infiltration  Workstation performed: [id number]       EKG, Pathology, and Other Studies: I have personally reviewed pertinent reports            Code Status: Level 1 - Full Code     Juani Templeton MD

## 2020-07-12 NOTE — PROGRESS NOTES
Pt received from PACU, A&Ox4, VS stable, denied pain  Pt voided, urine red in color, strained, no stone noted

## 2020-07-12 NOTE — ASSESSMENT & PLAN NOTE
· AST 60, ALT 83, phos 65  · Highly suspect somewhat chronic in nature due to imaging findings   · CT showed hepatomegaly with fatty infiltration  · Patient denies right upper pain

## 2020-07-12 NOTE — ASSESSMENT & PLAN NOTE
· Trended down appropriately to 1 29 status post IV fluids  · Suspect elevation secondary to IV contrast  · Creatinine 1 39, in Care everywhere last creatinine level was were 0 89  · Baseline difficult to assess not very many values to average  · Continue IV fluids  · Patient refuses opioids, for pain management started on Toradol every 6 hours PRN  · Continue to monitor with morning labs

## 2020-07-12 NOTE — ASSESSMENT & PLAN NOTE
Patient presented ED with blood pressure 207/113 with pain med improved to 164/82  · Blood pressure currently acceptable systolics in the 418J  · Suspect pain related  · He was on lisinopril in the past which was discontinued because his blood pressure was well controlled  · Status post IV hydralazine x1 and IV labetalol x1  · Continue to monitor with routine vitals

## 2020-07-12 NOTE — ASSESSMENT & PLAN NOTE
Background: Patient presented with left groin pain that radiated to left flank pain  Transferred from [address] [place] [address] for urology evaluation  · CT showed " 2 adjacent calculi measuring 3 mm and 2 mm in the left distal ureter causing mild hydroureteronephrosis with perinephric stranding  Tiny nonobstructing right intrarenal calculus"  · Scrotal ultrasound done due to left scrotal pain revealing "normal testes, bilateral varicoceles "  · UA negative for leukocytes, nitrate or bacteria, showed trace blood  · Patient remains afebrile, white blood cell count 10  · Disontinued IV fluids; encouraged continue oral hydration  · Initiated Tamsulosin  · Patient on Suboxone and would not like to come off of it  Refuses opioids  · Tylenol Q6hr scheduled   · Toradol 15 mg Q6hr PRN; however cautious due to DIVYA; discontinued on 7/12 secondary to adequate pain control   · Urology consulted; input appreciated  · I did speak to Urology in regards to intervention  Will place on the OR schedule for today 7/12 later this afternoon

## 2020-07-12 NOTE — ANESTHESIA PREPROCEDURE EVALUATION
Review of Systems/Medical History  Patient summary reviewed  Chart reviewed      Cardiovascular  Exercise tolerance (METS): >4,     Pulmonary  Not a smoker , No COPD , No asthma ,        GI/Hepatic            Endo/Other     GYN       Hematology   Musculoskeletal       Neurology   Psychology           Physical Exam    Airway    Mallampati score: II         Dental   No notable dental hx     Cardiovascular      Pulmonary      Other Findings  LEFT UPPER TOOTH CHIPPED/CRACKED    Lab Results   Component Value Date    WBC 7 77 07/12/2020    HGB 11 9 (L) 07/12/2020     07/12/2020     Lab Results   Component Value Date    SODIUM 140 07/12/2020    K 4 1 07/12/2020    BUN 16 07/12/2020    CREATININE 1 29 07/12/2020    EGFR 65 07/12/2020     No results found for: PTT   No results found for: INR      No results found for: HGBA1C      Anesthesia Plan  ASA Score- 3     Anesthesia Type- general with ASA Monitors  Additional Monitors:   Airway Plan:     Comment:  HENRIQUE Shetty , have personally seen and evaluated the patient prior to anesthetic care  I have reviewed the pre-anesthetic record, and other medical records if appropriate to the anesthetic care  If a CRNA is involved in the case, I have reviewed the CRNA assessment, if present, and agree  Risks/benefits and alternatives discussed with patient including possible PONV, sore throat, and possibility of rare anesthetic and surgical emergencies        Plan Factors- Patient instructed to abstain from smoking on day of procedure  Patient did not smoke on day of surgery  Induction- intravenous  Postoperative Plan-   Planned trial extubation    Informed Consent- Anesthetic plan and risks discussed with patient  I personally reviewed this patient with the CRNA  Discussed and agreed on the Anesthesia Plan with the CRNA  Young Rosas

## 2020-07-12 NOTE — CONSULTS
Consultation - Urology   Roderick Shepherd 51 y o  male MRN: [id number]  Unit/Bed#: OR DEVIKA Encounter: [id number] 7/12/2020           Assessment/Plan      Assessment:  Distal left ureteral stones  Plan:  Treatment options discussed with the patient  We will proceed with cystoscopy, left retrograde pyelography and left ureteroscopy  The procedure was described in detail  Risks were discussed and consent form signed  History of Present Illness   41-year-old male who initially developed gross hematuria last week  He then noted the onset of severe left flank pain  The pain radiated to his left lower quadrant  He had nausea and vomiting but no fever  The patient presented to the emergency room where he was found to have distal left ureteral stones  He remains symptomatic and was admitted for pain control  Urology is now consulted for further evaluation therapy  At the present time he is feeling a little better  He is voiding well  He denies dysuria  He has no fever  Attending: Cyndi Edge MD  Reason for Consult / Principal Problem:  Patient Active Problem List   Diagnosis    Nephrolithiasis    Transaminitis    Hypertensive urgency    Acute kidney injury ([facility] [address] )    Opioid dependence in remission [facility])       Inpatient consult to Urology  Consult performed by: Leisa Severs, MD  Consult ordered by: Cyndi Edge MD          Review of Systems   Constitutional: Negative for chills, diaphoresis, fatigue and fever  HENT: Negative  Eyes: Negative  Respiratory: Negative  Cardiovascular: Negative  Endocrine: Negative  Genitourinary: Positive for flank pain  See HPI   Skin: Negative  Allergic/Immunologic: Negative  Neurological: Negative  Hematological: Negative  Psychiatric/Behavioral: Negative  Historical Information   No Known Allergies  Past Medical History:   Diagnosis Date    Back pain     Chronic GERD      History reviewed   No pertinent surgical history  Social History   Social History     Substance and Sexual Activity   Alcohol Use Never    Frequency: Never     Social History     Substance and Sexual Activity   Drug Use Never     Social History     Tobacco Use   Smoking Status Unknown If Ever Smoked   Smokeless Tobacco Never Used     Family History: non-contributory    Meds/Allergies   all current active meds have been reviewed    Current Facility-Administered Medications:     [MAR Hold] acetaminophen (TYLENOL) tablet 650 mg, 650 mg, Oral, Q6H [facility], Miriam Galaviz MD, 650 mg at 07/12/20 1215    [MAR Hold] buprenorphine-naloxone (SUBOXONE) 8-2 mg per SL film 1 Film, 1 Film, Sublingual, Daily, Miriam Galaviz MD    [MAR Hold] pantoprazole (PROTONIX) EC tablet 40 mg, 40 mg, Oral, Daily, Miriam Galaviz MD, 40 mg at 07/12/20 0953    [MAR Hold] sodium chloride (OCEAN) 0 65 % nasal spray 1 spray, 1 spray, Each Nare, Q3H PRN, Adi Aguayo PA-C, 1 spray at 07/12/20 0015    [MAR Hold] tamsulosin (FLOMAX) capsule 0 4 mg, 0 4 mg, Oral, Daily With Seldon Barthel, MD, 0 4 mg at 07/11/20 1804    Current Facility-Administered Medications:  [MAR Hold] acetaminophen 650 mg Oral Q6H Pranay Elliott MD   [MAR Hold] buprenorphine-naloxone 1 Film Sublingual Daily Miriam Galaviz MD   [MAR Hold] pantoprazole 40 mg Oral Daily Miriam Galaviz MD   [MAR Hold] sodium chloride 1 spray Each Nare Q3H PRN Adi Aguayo PA-C   [MAR Hold] tamsulosin 0 4 mg Oral Daily With Seldon Barthel, MD     [facility] Hold] sodium chloride       Objective   Vitals: Blood pressure 144/89, pulse 80, temperature 98 2 °F (36 8 °C), resp  rate 16, SpO2 99 %  I/O last 24 hours: In: 1600 [I V :1600]  Out: 1115 [Urine:1115]    Invasive Devices     Peripheral Intravenous Line            Peripheral IV 07/11/20 Left Antecubital 1 day                Physical Exam   Constitutional: He is oriented to person, place, and time   He appears well-developed and well-nourished  HENT:   Head: Normocephalic and atraumatic  Eyes: Conjunctivae are normal    Neck: Neck supple  Cardiovascular: Normal rate  Pulmonary/Chest: Effort normal    Abdominal: He exhibits no distension and no mass  There is no tenderness  There is no rebound and no guarding  No hernia   Genitourinary: Penis normal    Genitourinary Comments: Testes descended and normal to palpation   Neurological: He is alert and oriented to person, place, and time  Skin: Skin is warm and dry  Psychiatric: He has a normal mood and affect  His behavior is normal  Judgment and thought content normal    Vitals reviewed  Lab Results:   I have personally reviewed pertinent reports  CBC:   Lab Results   Component Value Date    WBC 7 77 07/12/2020    HGB 11 9 (L) 07/12/2020    HCT 37 0 07/12/2020    MCV 82 07/12/2020     07/12/2020    MCH 26 4 (L) 07/12/2020    MCHC 32 2 07/12/2020    RDW 13 8 07/12/2020    MPV 9 3 07/12/2020    NRBC 0 07/12/2020     CMP:   Lab Results   Component Value Date    SODIUM 140 07/12/2020     07/12/2020    CO2 27 07/12/2020    BUN 16 07/12/2020    CREATININE 1 29 07/12/2020    CALCIUM 8 3 07/12/2020    EGFR 65 07/12/2020     Urinalysis: No results found for: [place], SPECGRAV, PHUR, LEUKOCYTESUR, NITRITE, PROTEINUA, GLUCOSEU, KETONESU, BILIRUBINUR, BLOODU    Imaging Studies: I have personally reviewed pertinent films in PACS  Us Scrotum And Testicles    Result Date: 7/11/2020  Narrative: SCROTAL ULTRASOUND INDICATION:    left scrotal pain  COMPARISON: CT 7/11/2020  TECHNIQUE:   Ultrasound the scrotal contents was performed with a high frequency linear transducer utilizing volumetric sweep imaging as well as standard still image techniques  Imaging performed in longitudinal and transverse orientation  Color and spectral Doppler evaluation also performed bilaterally  FINDINGS: TESTES: Testes are symmetric and normal in size   RIGHT testis = 4 5 x 3 4 x 3 1 cm Normal contour with homogeneous smooth echotexture  No intratesticular mass lesion or calcifications  LEFT testis = 4 0 x 2 4 x 3 1 cm Normal contour with homogeneous smooth echotexture  No intratesticular mass lesion or calcifications  Doppler flow within both testes is present and symmetric  EPIDIDYMIDES: Normal Size  Doppler ultrasound demonstrates normal blood flow  No epididymal lesions  HYDROCELE:  No significant fluid present  VARICOCELE:  Bilateral varicoceles are present  SCROTUM:  Scrotal thickness and appearance within normal limits  No evidence for extratesticular mass or hernia demonstrated  Impression:  1  Normal testes  2   Bilateral varicoceles  Workstation performed: [id number]     Ct Abdomen Pelvis With Contrast    Result Date: 7/11/2020  Narrative: CT ABDOMEN AND PELVIS WITH IV CONTRAST INDICATION:   Abdominal pain, acute, nonlocalized  COMPARISON:  None  TECHNIQUE:  CT examination of the abdomen and pelvis was performed  Axial, sagittal, and coronal 2D reformatted images were created from the source data and submitted for interpretation  Radiation dose length product (DLP) for this visit:  1591 mGy-cm   This examination, like all CT scans performed in the [facility], was performed utilizing techniques to minimize radiation dose exposure, including the use of iterative reconstruction and automated exposure control  IV Contrast:  100 mL of iohexol (OMNIPAQUE) Enteric Contrast:  Enteric contrast was not administered  FINDINGS: ABDOMEN LOWER CHEST:  No clinically significant abnormality identified in the visualized lower chest  LIVER/BILIARY TREE:  Liver is enlarged with diffusely decreased density consistent with fatty change  No CT evidence of suspicious hepatic mass  Normal hepatic contours  No biliary dilatation  GALLBLADDER:  No calcified gallstones  No pericholecystic inflammatory change  SPLEEN:  Unremarkable  PANCREAS:  Unremarkable  ADRENAL GLANDS:  Unremarkable  KIDNEYS/URETERS:  There is mild left hydroureteronephrosis to the level of 2 adjacent calculi in the distal ureter, measuring 3 mm and 2 mm, approximately 2 cm above the uterovesical junction  There is moderate perinephric stranding  There is a punctate calculus in the interpolar right kidney, which is otherwise unremarkable  STOMACH AND BOWEL:  Unremarkable  APPENDIX:  No findings to suggest appendicitis  ABDOMINOPELVIC CAVITY:  No ascites  No pneumoperitoneum  No lymphadenopathy  VESSELS:  Unremarkable for patient's age  PELVIS REPRODUCTIVE ORGANS:  Unremarkable for patient's age  URINARY BLADDER:  Unremarkable  ABDOMINAL WALL/INGUINAL REGIONS:  There is a small fat-containing umbilical hernia  A 4 1 cm intramuscular lipoma is noted within the visualized inferior portion of the left latissimus musculature in the posterior lower chest  OSSEOUS STRUCTURES:  No acute fracture or destructive osseous lesion  Impression: Two adjacent calculi measuring 3 mm and 2 mm in the left distal ureter causing mild hydroureteronephrosis with perinephric stranding  Tiny nonobstructing right intrarenal calculus  Hepatomegaly with fatty infiltration  Workstation performed: [id number]       EKG, Pathology, and Other Studies: I have personally reviewed pertinent reports            Code Status: Level 1 - Full Code     Ralph Bennett MD

## 2020-07-12 NOTE — PROGRESS NOTES
Progress Note - Clarice La 1970, 51 y o  male MRN: [id number]    Unit/Bed#: -01 Encounter: [id number]    Primary Care Provider: Brenton Hernandez MD   Date and time admitted to hospital: 7/11/2020  2:47 PM      * Nephrolithiasis  Assessment & Plan  Background: Patient presented with left groin pain that radiated to left flank pain  Transferred from [address] [place] [address] for urology evaluation  · CT showed " 2 adjacent calculi measuring 3 mm and 2 mm in the left distal ureter causing mild hydroureteronephrosis with perinephric stranding  Tiny nonobstructing right intrarenal calculus"  · Scrotal ultrasound done due to left scrotal pain revealing "normal testes, bilateral varicoceles "  · UA negative for leukocytes, nitrate or bacteria, showed trace blood  · Patient remains afebrile, white blood cell count 10  · Disontinued IV fluids; encouraged continue oral hydration  · Initiated Tamsulosin  · Patient on Suboxone and would not like to come off of it  Refuses opioids  · Tylenol Q6hr scheduled   · Toradol 15 mg Q6hr PRN; however cautious due to DIVYA; discontinued on 7/12 secondary to adequate pain control   · Urology consulted; input appreciated  · I did speak to Urology in regards to intervention  Will place on the OR schedule for today 7/12 later this afternoon          Acute kidney injury ([address] )  Assessment & Plan  · Trended down appropriately to 1 29 status post IV fluids  · Suspect elevation secondary to IV contrast  · Creatinine 1 39, in Care everywhere last creatinine level was were 0 89  · Baseline difficult to assess not very many values to average  · Continue IV fluids  · Patient refuses opioids, for pain management started on Toradol every 6 hours PRN  · Continue to monitor with morning labs    Hypertensive urgency  Assessment & Plan  Patient presented ED with blood pressure 207/113 with pain med improved to 164/82  · Blood pressure currently acceptable systolics in the 179F  · Suspect pain related  · He was on lisinopril in the past which was discontinued because his blood pressure was well controlled  · Status post IV hydralazine x1 and IV labetalol x1  · Continue to monitor with routine vitals    Opioid dependence in remission [facility])  Assessment & Plan  · Patient has Suboxone, does not want a stop taking it  · Refuses opioids  · Requested to give a call to Dr Shawn Martinez [phone] with managing Suboxone to discuss pain management; voicemail was left by admitting physician  Will defer any contact to weekday shift if pain unable to be controlled on current regimen     Transaminitis  Assessment & Plan  · AST 60, ALT 83, phos 65  · Highly suspect somewhat chronic in nature due to imaging findings   · CT showed hepatomegaly with fatty infiltration  · Patient denies right upper pain    VTE Pharmacologic Prophylaxis:   Pharmacologic: low risk and ambulatory   Mechanical VTE Prophylaxis in Place: Yes    Patient Centered Rounds: I have performed bedside rounds with nursing staff today  Discussions with Specialists or Other Care Team Provider:  Nursing staff and Urology    Education and Discussions with Family / Patient:  Education provided the bedside regards to plan of care as noted above  Time Spent for Care: 30 minutes  More than 50% of total time spent on counseling and coordination of care as described above  Current Length of Stay: 1 day(s)    Current Patient Status: Observation   Certification Statement: The patient will continue to require additional inpatient hospital stay due to OR schedule for today  Discharge Plan:  Possibly on  with Urology clearance, adequate pain control, status post OR intervention  Code Status: Level 1 - Full Code      Subjective:   Patient overall reports that his pain is well controlled and at afford  He reports he does not want any change in his current medication regimen      Objective:     Vitals:   Temp (24hrs), Av °F (36 7 °C), Min:97 5 °F (36 4 °C), Max:98 2 °F (36 8 °C)    Temp:  [97 5 °F (36 4 °C)-98 2 °F (36 8 °C)] 98 2 °F (36 8 °C)  HR:  [57-84] 80  Resp:  [16-20] 16  BP: (142-207)/() 144/89  SpO2:  [92 %-100 %] 99 %  There is no height or weight on file to calculate BMI  Input and Output Summary (last 24 hours): Intake/Output Summary (Last 24 hours) at 7/12/2020 0858  Last data filed at 7/12/2020 0831  Gross per 24 hour   Intake 1600 ml   Output 1015 ml   Net 585 ml       Physical Exam:     Physical Exam   Constitutional: He is oriented to person, place, and time  He is cooperative  Cardiovascular: Normal rate, regular rhythm, normal heart sounds and intact distal pulses  Pulses:       Radial pulses are 2+ on the right side  Dorsalis pedis pulses are 2+ on the right side  Posterior tibial pulses are 2+ on the right side  No peripheral edema noted  Pulmonary/Chest: Effort normal and breath sounds normal  No respiratory distress  He has no wheezes  Abdominal: Soft  Bowel sounds are normal  He exhibits no distension  There is tenderness in the suprapubic area  Musculoskeletal:        Arms:  Neurological: He is alert and oriented to person, place, and time  Skin: Skin is warm and dry  Capillary refill takes less than 2 seconds  Psychiatric: He has a normal mood and affect  His speech is normal and behavior is normal  Judgment normal    Vitals reviewed        Additional Data:     Labs:    Results from last 7 days   Lab Units 07/12/20  0746   WBC Thousand/uL 7 77   HEMOGLOBIN g/dL 11 9*   HEMATOCRIT % 37 0   PLATELETS Thousands/uL 157   NEUTROS PCT % 74   LYMPHS PCT % 12*   MONOS PCT % 12   EOS PCT % 1     Results from last 7 days   Lab Units 07/12/20  0746 07/11/20  0942   SODIUM mmol/L 140 140   POTASSIUM mmol/L 4 1 4 1   CHLORIDE mmol/L 108 103   CO2 mmol/L 27 26   BUN mg/dL 16 18   CREATININE mg/dL 1 29 1 39*   ANION GAP mmol/L 5 11   CALCIUM mg/dL 8 3 8 0*   ALBUMIN g/dL  --  4 1   TOTAL BILIRUBIN mg/dL  --  0 51   ALK PHOS U/L  --  165*   ALT U/L  --  83*   AST U/L  --  60*   GLUCOSE RANDOM mg/dL 139 176*                 Results from last 7 days   Lab Units 07/11/20  0942   LACTIC ACID mmol/L 2 5*           * I Have Reviewed All Lab Data Listed Above  * Additional Pertinent Lab Tests Reviewed: Yoanna 66 Admission Reviewed    Imaging:    Imaging Reports Reviewed Today Include:  Ultrasound scrotum and testicles and CT abdomen pelvis with contrast  Imaging Personally Reviewed by Myself Includes:  None    Recent Cultures (last 7 days):           Last 24 Hours Medication List:     Current Facility-Administered Medications:  acetaminophen 650 mg Oral Q6H Rupa Suarez MD   buprenorphine-naloxone 1 Film Sublingual Daily Rosy Dumont MD   pantoprazole 40 mg Oral Daily Rosy Dumont MD   sodium chloride 1 spray Each Nare Q3H PRN Harvey Nunez PA-C   tamsulosin 0 4 mg Oral Daily With Cierra South MD        Today, Patient Was Seen By: LEENA Gilbert    ** Please Note: Dictation voice to text software may have been used in the creation of this document   **

## 2020-07-12 NOTE — OP NOTE
OPERATIVE REPORT  PATIENT NAME: Erin Patton    :  1970  MRN: [id number]  Pt Location: [facility] ROOM 01    SURGERY DATE: 2020    Surgeon(s) and Role:     * Sami Nuñez MD - Primary    Preop Diagnosis:  Nephrolithiasis [N20 0]    Post-Op Diagnosis Codes:     * Left ureteral stone [N20 1]    Procedure(s) (LRB):  CYSTOSCOPY URETEROSCOPY , RETROGRADE PYELOGRAM AND INSERTION STENT URETERAL (Left)    Specimen(s):  * No specimens in log *    Estimated Blood Loss:   Minimal    Drains:  * No LDAs found *    Anesthesia Type:   Choice    Operative Indications:  Nephrolithiasis [N20 0]  Left ureteral stones    Operative Findings:  Normal urethra with predominantly bilobar prostatic hypertrophy causing partial obstruction  Ureteral orifices are normal   The bladder is mildly trabeculated  There are no stones, tumors or diverticula in the bladder  Preliminary fluoroscopic images were unremarkable  Left retrograde pyelography did not reveal any definite stone  Ureteroscopy was performed up to the ureterovesical junction but at this point there is a tight stricture that did not permit further examination of the collecting system  Attempts to balloon dilate the area were not successful  A stent was left in place to allow for healing and for soft dilation  The stent should remain in place for approximately 10 days to 2 weeks  A CT scan can be obtained to see if the stones passed spontaneously around the stent prior to considering repeat ureteroscopy  Complications:   None    Procedure and Technique:  The patient is brought the operating room properly identified  General anesthesia was administered  He was placed in lithotomy position prepped and draped in usual sterile fashion  Compression boots were employed  Intravenous antibiotic was administered  An appropriate time-out was performed  Cystourethroscopy was performed with 25 Tajik cystoscope with findings as above    An open-ended ureteral catheter was then employed along with dilute Omnipaque to perform a left retrograde  No definite stone was seen but patient's body habitus made visualization difficult  I elected to proceed with ureteroscopy  A 0 035 guidewire was then passed up the left collecting system and into the kidney under fluoroscopic guidance  The bladder was drained and cystoscope removed  The short semi rigid ureteral scope was employed alongside the wire  The ureteral scope went just into the ureterovesical junction where a scar was noted  At this point I placed an additional wire through the ureteral scope in an attempt to railroad through the scar  This was not successful  The ureteral scope was withdrawn and the guidewire backloaded through the cystoscope  A 4 cm ureteral dilating balloon was then employed and attempts made to dilate the stricture  This was unsuccessful and I could not successfully opened the narrowed waist to allow for ureteroscopy  1 final attempt a ureteroscopy was made and this was unsuccessful  I elected to place a stent  An open-ended catheter was placed over the guidewire and placed up to the kidney  The guidewire was withdrawn and a contrast injection performed to ensure that the wire was in good position  Guidewire was replaced  The guidewire was then backloaded through the cystoscope and a 6 Lao stent easily placed over the wire  The stent was  pushed into position and seen to coil nicely in the renal pelvis  With removal of the guidewire a nice coil was seen in the bladder  The bladder was drained and cystoscope removed  The string was cut short to aid in the stents later removal   The patient was awakened from anesthesia  He was taken to the recovery room in satisfactory condition     I was present for the entire procedure    Patient Disposition:  PACU     SIGNATURE: Reyna Mae MD  DATE: July 12, 2020  TIME: 3:04 PM

## 2020-07-12 NOTE — PLAN OF CARE
Problem: PAIN - ADULT  Goal: Verbalizes/displays adequate comfort level or baseline comfort level  Description  Interventions:  - Encourage patient to monitor pain and request assistance  - Assess pain using appropriate pain scale  - Administer analgesics based on type and severity of pain and evaluate response  - Implement non-pharmacological measures as appropriate and evaluate response  - Consider cultural and social influences on pain and pain management  - Notify physician/advanced practitioner if interventions unsuccessful or patient reports new pain  Outcome: Progressing     Problem: INFECTION - ADULT  Goal: Absence or prevention of progression during hospitalization  Description  INTERVENTIONS:  - Assess and monitor for signs and symptoms of infection  - Monitor lab/diagnostic results  - Monitor all insertion sites, i e  indwelling lines, tubes, and drains  - Monitor endotracheal if appropriate and nasal secretions for changes in amount and color  - [place] appropriate cooling/warming therapies per order  - Administer medications as ordered  - Instruct and encourage patient and family to use good hand hygiene technique  - Identify and instruct in appropriate isolation precautions for identified infection/condition  Outcome: Progressing  Goal: Absence of fever/infection during neutropenic period  Description  INTERVENTIONS:  - Monitor WBC    Outcome: Progressing     Problem: SAFETY ADULT  Goal: Patient will remain free of falls  Description  INTERVENTIONS:  - Assess patient frequently for physical needs  -  Identify cognitive and physical deficits and behaviors that affect risk of falls    -  [place] fall precautions as indicated by assessment   - Educate patient/family on patient safety including physical limitations  - Instruct patient to call for assistance with activity based on assessment  - Modify environment to reduce risk of injury  - Consider OT/PT consult to assist with strengthening/mobility  Outcome: Progressing  Goal: Maintain or return to baseline ADL function  Description  INTERVENTIONS:  -  Assess patient's ability to carry out ADLs; assess patient's baseline for ADL function and identify physical deficits which impact ability to perform ADLs (bathing, care of mouth/teeth, toileting, grooming, dressing, etc )  - Assess/evaluate cause of self-care deficits   - Assess range of motion  - Assess patient's mobility; develop plan if impaired  - Assess patient's need for assistive devices and provide as appropriate  - Encourage maximum independence but intervene and supervise when necessary  - Involve family in performance of ADLs  - Assess for home care needs following discharge   - Consider OT consult to assist with ADL evaluation and planning for discharge  - Provide patient education as appropriate  Outcome: Progressing  Goal: Maintain or return mobility status to optimal level  Description  INTERVENTIONS:  - Assess patient's baseline mobility status (ambulation, transfers, stairs, etc )    - Identify cognitive and physical deficits and behaviors that affect mobility  - Identify mobility aids required to assist with transfers and/or ambulation (gait belt, sit-to-stand, lift, walker, cane, etc )  - [place] fall precautions as indicated by assessment  - Record patient progress and toleration of activity level on Mobility SBAR; progress patient to next Phase/Stage  - Instruct patient to call for assistance with activity based on assessment  - Consider rehabilitation consult to assist with strengthening/weightbearing, etc   Outcome: Progressing     Problem: DISCHARGE PLANNING  Goal: Discharge to home or other facility with appropriate resources  Description  INTERVENTIONS:  - Identify barriers to discharge w/patient and caregiver  - Arrange for needed discharge resources and transportation as appropriate  - Identify discharge learning needs (meds, wound care, etc )  - Arrange for interpretive services to assist at discharge as needed  - Refer to Case Management Department for coordinating discharge planning if the patient needs post-hospital services based on physician/advanced practitioner order or complex needs related to functional status, cognitive ability, or social support system  Outcome: Progressing     Problem: Knowledge Deficit  Goal: Patient/family/caregiver demonstrates understanding of disease process, treatment plan, medications, and discharge instructions  Description  Complete learning assessment and assess knowledge base    Interventions:  - Provide teaching at level of understanding  - Provide teaching via preferred learning methods  Outcome: Progressing

## 2020-07-12 NOTE — ASSESSMENT & PLAN NOTE
· Patient has Suboxone, does not want a stop taking it  · Refuses opioids  · Requested to give a call to Dr Garsia Copping [phone] with managing Suboxone to discuss pain management; voicemail was left by admitting physician   Will defer any contact to weekday shift if pain unable to be controlled on current regimen

## 2020-07-12 NOTE — PLAN OF CARE
Problem: PAIN - ADULT  Goal: Verbalizes/displays adequate comfort level or baseline comfort level  Description  Interventions:  - Encourage patient to monitor pain and request assistance  - Assess pain using appropriate pain scale  - Administer analgesics based on type and severity of pain and evaluate response  - Implement non-pharmacological measures as appropriate and evaluate response  - Consider cultural and social influences on pain and pain management  - Notify physician/advanced practitioner if interventions unsuccessful or patient reports new pain  Outcome: Progressing     Problem: INFECTION - ADULT  Goal: Absence or prevention of progression during hospitalization  Description  INTERVENTIONS:  - Assess and monitor for signs and symptoms of infection  - Monitor lab/diagnostic results  - Monitor all insertion sites, i e  indwelling lines, tubes, and drains  - Monitor endotracheal if appropriate and nasal secretions for changes in amount and color  - [place] appropriate cooling/warming therapies per order  - Administer medications as ordered  - Instruct and encourage patient and family to use good hand hygiene technique  - Identify and instruct in appropriate isolation precautions for identified infection/condition  Outcome: Progressing  Goal: Absence of fever/infection during neutropenic period  Description  INTERVENTIONS:  - Monitor WBC    Outcome: Progressing     Problem: SAFETY ADULT  Goal: Patient will remain free of falls  Description  INTERVENTIONS:  - Assess patient frequently for physical needs  -  Identify cognitive and physical deficits and behaviors that affect risk of falls    -  [place] fall precautions as indicated by assessment   - Educate patient/family on patient safety including physical limitations  - Instruct patient to call for assistance with activity based on assessment  - Modify environment to reduce risk of injury  - Consider OT/PT consult to assist with strengthening/mobility  Outcome: Progressing  Goal: Maintain or return to baseline ADL function  Description  INTERVENTIONS:  -  Assess patient's ability to carry out ADLs; assess patient's baseline for ADL function and identify physical deficits which impact ability to perform ADLs (bathing, care of mouth/teeth, toileting, grooming, dressing, etc )  - Assess/evaluate cause of self-care deficits   - Assess range of motion  - Assess patient's mobility; develop plan if impaired  - Assess patient's need for assistive devices and provide as appropriate  - Encourage maximum independence but intervene and supervise when necessary  - Involve family in performance of ADLs  - Assess for home care needs following discharge   - Consider OT consult to assist with ADL evaluation and planning for discharge  - Provide patient education as appropriate  Outcome: Progressing  Goal: Maintain or return mobility status to optimal level  Description  INTERVENTIONS:  - Assess patient's baseline mobility status (ambulation, transfers, stairs, etc )    - Identify cognitive and physical deficits and behaviors that affect mobility  - Identify mobility aids required to assist with transfers and/or ambulation (gait belt, sit-to-stand, lift, walker, cane, etc )  - [place] fall precautions as indicated by assessment  - Record patient progress and toleration of activity level on Mobility SBAR; progress patient to next Phase/Stage  - Instruct patient to call for assistance with activity based on assessment  - Consider rehabilitation consult to assist with strengthening/weightbearing, etc   Outcome: Progressing     Problem: DISCHARGE PLANNING  Goal: Discharge to home or other facility with appropriate resources  Description  INTERVENTIONS:  - Identify barriers to discharge w/patient and caregiver  - Arrange for needed discharge resources and transportation as appropriate  - Identify discharge learning needs (meds, wound care, etc )  - Arrange for interpretive services to assist at discharge as needed  - Refer to Case Management Department for coordinating discharge planning if the patient needs post-hospital services based on physician/advanced practitioner order or complex needs related to functional status, cognitive ability, or social support system  Outcome: Progressing     Problem: Knowledge Deficit  Goal: Patient/family/caregiver demonstrates understanding of disease process, treatment plan, medications, and discharge instructions  Description  Complete learning assessment and assess knowledge base    Interventions:  - Provide teaching at level of understanding  - Provide teaching via preferred learning methods  Outcome: Progressing

## 2020-07-12 NOTE — ANESTHESIA POSTPROCEDURE EVALUATION
Post-Op Assessment Note    CV Status:  Stable  Pain Score: 0    Pain management: adequate     Mental Status:  Alert and awake   Hydration Status:  Euvolemic   PONV Controlled:  Controlled   Airway Patency:  Patent   Post Op Vitals Reviewed: Yes      Staff: CRNA           /90   Temp   97 1   Pulse  90   Resp   16   SpO2   95 on FM

## 2020-07-12 NOTE — INTERVAL H&P NOTE
H&P reviewed  After examining the patient I find no changes in the patients condition since the H&P had been written  Vitals:    07/12/20 0746   BP: 144/89   Pulse:    Resp: 16   Temp: 98 2 °F (36 8 °C)   SpO2:    Procedure reviewed with the patient in the holding unit  Laterality marked-left

## 2020-07-13 VITALS
DIASTOLIC BLOOD PRESSURE: 103 MMHG | RESPIRATION RATE: 20 BRPM | SYSTOLIC BLOOD PRESSURE: 153 MMHG | TEMPERATURE: 98.9 F | BODY MASS INDEX: 41.3 KG/M2 | HEIGHT: 71 IN | WEIGHT: 295 LBS | HEART RATE: 74 BPM | OXYGEN SATURATION: 98 %

## 2020-07-13 LAB
ANION GAP SERPL CALCULATED.3IONS-SCNC: 5 MMOL/L (ref 4–13)
BASOPHILS # BLD AUTO: 0.02 THOUSANDS/ΜL (ref 0–0.1)
BASOPHILS NFR BLD AUTO: 0 % (ref 0–1)
BUN SERPL-MCNC: 12 MG/DL (ref 5–25)
CALCIUM SERPL-MCNC: 8.5 MG/DL (ref 8.3–10.1)
CHLORIDE SERPL-SCNC: 108 MMOL/L (ref 100–108)
CO2 SERPL-SCNC: 28 MMOL/L (ref 21–32)
CREAT SERPL-MCNC: 1.01 MG/DL (ref 0.6–1.3)
EOSINOPHIL # BLD AUTO: 0.09 THOUSAND/ΜL (ref 0–0.61)
EOSINOPHIL NFR BLD AUTO: 2 % (ref 0–6)
ERYTHROCYTE [DISTWIDTH] IN BLOOD BY AUTOMATED COUNT: 13.8 % (ref 11.6–15.1)
GFR SERPL CREATININE-BSD FRML MDRD: 88 ML/MIN/1.73SQ M
GLUCOSE SERPL-MCNC: 188 MG/DL (ref 65–140)
HCT VFR BLD AUTO: 40.6 % (ref 36.5–49.3)
HGB BLD-MCNC: 13.2 G/DL (ref 12–17)
IMM GRANULOCYTES # BLD AUTO: 0.05 THOUSAND/UL (ref 0–0.2)
IMM GRANULOCYTES NFR BLD AUTO: 1 % (ref 0–2)
LYMPHOCYTES # BLD AUTO: 0.96 THOUSANDS/ΜL (ref 0.6–4.47)
LYMPHOCYTES NFR BLD AUTO: 17 % (ref 14–44)
MCH RBC QN AUTO: 26.8 PG (ref 26.8–34.3)
MCHC RBC AUTO-ENTMCNC: 32.5 G/DL (ref 31.4–37.4)
MCV RBC AUTO: 82 FL (ref 82–98)
MONOCYTES # BLD AUTO: 0.52 THOUSAND/ΜL (ref 0.17–1.22)
MONOCYTES NFR BLD AUTO: 9 % (ref 4–12)
NEUTROPHILS # BLD AUTO: 3.92 THOUSANDS/ΜL (ref 1.85–7.62)
NEUTS SEG NFR BLD AUTO: 71 % (ref 43–75)
NRBC BLD AUTO-RTO: 0 /100 WBCS
PLATELET # BLD AUTO: 173 THOUSANDS/UL (ref 149–390)
PMV BLD AUTO: 9 FL (ref 8.9–12.7)
POTASSIUM SERPL-SCNC: 3.8 MMOL/L (ref 3.5–5.3)
RBC # BLD AUTO: 4.93 MILLION/UL (ref 3.88–5.62)
SODIUM SERPL-SCNC: 141 MMOL/L (ref 136–145)
WBC # BLD AUTO: 5.56 THOUSAND/UL (ref 4.31–10.16)

## 2020-07-13 PROCEDURE — 99239 HOSP IP/OBS DSCHRG MGMT >30: CPT | Performed by: NURSE PRACTITIONER

## 2020-07-13 PROCEDURE — 80048 BASIC METABOLIC PNL TOTAL CA: CPT | Performed by: UROLOGY

## 2020-07-13 PROCEDURE — 85025 COMPLETE CBC W/AUTO DIFF WBC: CPT | Performed by: UROLOGY

## 2020-07-13 RX ORDER — TAMSULOSIN HYDROCHLORIDE 0.4 MG/1
0.4 CAPSULE ORAL
Qty: 30 CAPSULE | Refills: 0 | Status: SHIPPED | OUTPATIENT
Start: 2020-07-13 | End: 2020-07-24

## 2020-07-13 RX ADMIN — ACETAMINOPHEN 650 MG: 325 TABLET, FILM COATED ORAL at 05:03

## 2020-07-13 RX ADMIN — ACETAMINOPHEN 650 MG: 325 TABLET, FILM COATED ORAL at 00:05

## 2020-07-13 RX ADMIN — PANTOPRAZOLE SODIUM 40 MG: 40 TABLET, DELAYED RELEASE ORAL at 09:30

## 2020-07-13 RX ADMIN — ACETAMINOPHEN 650 MG: 325 TABLET, FILM COATED ORAL at 12:03

## 2020-07-13 RX ADMIN — SODIUM CHLORIDE, SODIUM LACTATE, POTASSIUM CHLORIDE, AND CALCIUM CHLORIDE 20 ML/HR: .6; .31; .03; .02 INJECTION, SOLUTION INTRAVENOUS at 06:26

## 2020-07-13 RX ADMIN — BUPRENORPHINE AND NALOXONE 1 FILM: 8; 2 FILM BUCCAL; SUBLINGUAL at 09:30

## 2020-07-13 NOTE — DISCHARGE INSTR - AVS FIRST PAGE
Dear Delia Gary,     It was our pleasure to care for you here at [facility], [facility]  It is our hope that we were always able to exceed the expected standards for your care during your stay  You were hospitalized due to kidney stones requiring OR intervention  You were cared for on the 7th floor by LEENA Norris under the service of Brooke To MD with the [facility] who covers for your primary care physician (PCP), Dashawn Britton MD, while you were hospitalized  If you have any questions or concerns related to this hospitalization, you may contact us at [phone]  For follow up as well as any medication refills, we recommend that you follow up with your primary care physician  A registered nurse will reach out to you by phone within a few days after your discharge to answer any additional questions that you may have after going home  However, at this time we provide for you here, the most important instructions / recommendations at discharge:     · Notable Medication Adjustments -   · Initiation of Flomax 0 4 mg daily with dinner  · Testing Required after Discharge -   · None  · Important follow up information -   · Please call Jovanni Rios' office for appointment scheduling  · Stent will need to remain in place for 10-14 days  · Please call and schedule appointment with your primary care doctor following stent removal to further monitor blood pressure  · Other Instructions -   · Please use strainer given to you at discharge to strain all urine and monitor for any spontaneous passing of stones  · Please review this entire after visit summary as additional general instructions including medication list, appointments, activity, diet, any pertinent wound care, and other additional recommendations from your care team that may be provided for you        Sincerely,     LEENA Norris

## 2020-07-13 NOTE — ASSESSMENT & PLAN NOTE
Background: Patient presented with left groin pain that radiated to left flank pain  Transferred from [address] [place] [address] for urology evaluation  · CT showed " 2 adjacent calculi measuring 3 mm and 2 mm in the left distal ureter causing mild hydroureteronephrosis with perinephric stranding  Tiny nonobstructing right intrarenal calculus"  · Scrotal ultrasound done due to left scrotal pain revealing "normal testes, bilateral varicoceles "  · UA negative for leukocytes, nitrate or bacteria, showed trace blood  · Patient remains afebrile, white blood cell count 10  · Disontinued IV fluids; encouraged continue oral hydration  · Initiated Tamsulosin  · Patient on Suboxone and would not like to come off of it  Refuses opioids      · Tylenol Q6hr scheduled   · Toradol 15 mg Q6hr PRN; however cautious due to DIVYA; discontinued on 7/12 secondary to adequate pain control   · Urology consulted; input appreciated  · Status post cystoscopy ureteroscopy, retrograde pyelogram and insertion of left ureteral stent  · Stents remain in place for approximately 10-14 days  · CT scan can be obtained if the stones passed spontaneously around the stent prior to considering repeat ureteroscopy  · Advised to call Dr Simone Hill office for appointment scheduling

## 2020-07-13 NOTE — UTILIZATION REVIEW
Initial Clinical Review    OBSERVATION   7-11-20 1540  CHANGED TO INPATIENT 7-12-20 0874 FOR SURGERY AND POST OP CARE     Admission: Date/Time/Statement: Admission Orders (From admission, onward)     Ordered        07/11/20 1540  Place in Observation  Once                   Orders Placed This Encounter   Procedures    Place in Observation     Standing Status:   Standing     Number of Occurrences:   1     Order Specific Question:   Admitting Physician     Answer:   Malinda Carlton [[id number]]     Order Specific Question:   Level of Care     Answer:   Med Surg [16]     ED Arrival Information     Patient not seen in ED -- tx from [address] [place] [address] ED                     Chief complaint: L flank pain     Assessment/Plan: 38 y/o male with a PMHx of GERD, opiate dependence currently on Suboxone, who presented initially to [address]  with hematuria  Pt states Tuesday he developed hematuria, called his PCP who said he is most likely dehydrated because he was working outside a lot  He was drinking more fluids and hematuria resolved  On Friday he developed left groin pain which radiated to his L flank, sharp intermittent, rated 10/10, a/w difficulty with urination  He presented to [facility] ED found to have on CT 2 adjacent calculi measuring 3 mm and 2 mm in the left distal ureter causing mild hydroureteronephrosis  He was transferred to [facility] for urology eval and further tx  Patient is on Suboxone, refusing to stop Suboxone  Refuses opioids  UA neg, scrotal US wnl  Admit observation with nephrolithiasis  Continue IVF's, tamsulosin  Tylenol  toradol prn  Repeat BMP in AM  Npo after MN  Urology consulted      OPERATIVE REPORT  SURGERY DATE: 7/12/2020  Procedure(s) (LRB):  CYSTOSCOPY URETEROSCOPY , RETROGRADE PYELOGRAM AND INSERTION STENT URETERAL (Left)  Anesthesia Type:   Choice  Operative Findings:  Normal urethra with predominantly bilobar prostatic hypertrophy causing partial obstruction  Ureteral orifices are normal   The bladder is mildly trabeculated  There are no stones, tumors or diverticula in the bladder  Preliminary fluoroscopic images were unremarkable  Left retrograde pyelography did not reveal any definite stone  Ureteroscopy was performed up to the ureterovesical junction but at this point there is a tight stricture that did not permit further examination of the collecting system  Attempts to balloon dilate the area were not successful  A stent was left in place to allow for healing and for soft dilation  The stent should remain in place for approximately 10 days to 2 weeks  A CT scan can be obtained to see if the stones passed spontaneously around the stent prior to considering repeat ureteroscopy  POST OP   Strain all urine  Plan to start flomax 7-14  Continue iv fluids LR  20/hr  Repeat bmp and cbc in am     Post op : pain resolved  ED Triage Vitals   Temperature Pulse Respirations Blood Pressure SpO2   07/11/20 1504 07/11/20 1504 07/11/20 1504 07/11/20 1504 07/11/20 1504   98 °F (36 7 °C) 77 18 155/95 98 %      Temp Source Heart Rate Source Patient Position - Orthostatic VS BP Location FiO2 (%)   07/11/20 2218 -- -- -- --   Oral          Pain Score       07/11/20 1519       5        Wt Readings from Last 1 Encounters:   07/11/20 134 kg (295 lb)     Additional Vital Signs:   Vital Signs (last 2 days)     Date/Time  Temp  Pulse  Resp  BP  MAP (mmHg)  SpO2   07/12/20 07:46:13  98 2 °F (36 8 °C)  --  16  144/89  107  --   07/11/20 2218  98 1 °F (36 7 °C)  80  18  142/85  --  99 %   07/11/20 1504  98 °F (36 7 °C)  77  18  155/95  --  98 %       Pertinent Labs/Diagnostic Test Results:   CT a/p 7/11 -- Two adjacent calculi measuring 3 mm and 2 mm in the left distal ureter causing mild hydroureteronephrosis with perinephric stranding  Tiny nonobstructing right intrarenal calculus  Hepatomegaly with fatty infiltration      US scrotum and testicle 7/11 -- 1   Normal testes  2   Bilateral varicoceles         Results from last 7 days   Lab Units 07/12/20  0746 07/11/20  0942   WBC Thousand/uL 7 77 10 70*   HEMOGLOBIN g/dL 11 9* 14 0   HEMATOCRIT % 37 0 42 3   PLATELETS Thousands/uL 157 215   NEUTROS ABS Thousands/µL 5 75 8 91*     Results from last 7 days   Lab Units 07/12/20  0746 07/11/20  0942   SODIUM mmol/L 140 140   POTASSIUM mmol/L 4 1 4 1   CHLORIDE mmol/L 108 103   CO2 mmol/L 27 26   ANION GAP mmol/L 5 11   BUN mg/dL 16 18   CREATININE mg/dL 1 29 1 39*   EGFR ml/min/1 73sq m 65 60   CALCIUM mg/dL 8 3 8 0*   MAGNESIUM mg/dL  --  1 9     Results from last 7 days   Lab Units 07/11/20  0942   AST U/L 60*   ALT U/L 83*   ALK PHOS U/L 165*   TOTAL PROTEIN g/dL 7 4   ALBUMIN g/dL 4 1   TOTAL BILIRUBIN mg/dL 0 51     Results from last 7 days   Lab Units 07/12/20  0746 07/11/20  0942   GLUCOSE RANDOM mg/dL 139 176*     Results from last 7 days   Lab Units 07/11/20  0942   LACTIC ACID mmol/L 2 5*     Results from last 7 days   Lab Units 07/11/20  0942   LIPASE u/L 109     Results from last 7 days   Lab Units 07/11/20  0946   CLARITY UA  Clear   COLOR UA  Straw   SPEC GRAV UA  1 020   PH UA  7 0   GLUCOSE UA mg/dl 250 (1/4%)*   KETONES UA mg/dl Negative   BLOOD UA  Trace-Intact*   PROTEIN UA mg/dl Negative   NITRITE UA  Negative   BILIRUBIN UA  Negative   UROBILINOGEN UA E U /dl 0 2   LEUKOCYTES UA  Negative   WBC UA /hpf None Seen   RBC UA /hpf 1-2*   BACTERIA UA /hpf None Seen   EPITHELIAL CELLS WET PREP /hpf None Seen       Past Medical History:   Diagnosis Date    Back pain     Chronic GERD      Present on Admission:  **None**      Admitting Diagnosis: Nephrolithiasis [N20 0]  Age/Sex: 51 y o  male  Admission Orders:  Scheduled Medications:  Medications:  acetaminophen 650 mg Oral Q6H [address]   buprenorphine-naloxone 1 Film Sublingual Daily   pantoprazole 40 mg Oral Daily   tamsulosin 0 4 mg Oral Daily With Dinner     sodium chloride 0 9 % infusion   Rate: 200 mL/hr Dose: 200 mL/hr  Freq: Continuous Route: IV  Start: 07/11/20 1700 End: 07/12/20 0804     PRN Meds:  sodium chloride 1 spray Each Nare Q3H PRN       IP CONSULT TO UROLOGY    Network Utilization Review Department  [email or web address]  ATTENTION: Please call with any questions or concerns to [phone] and carefully listen to the prompts so that you are directed to the right person  All voicemails are confidential   Davidson Gina all requests for admission clinical reviews, approved or denied determinations and any other requests to dedicated fax number below belonging to the campus where the patient is receiving treatment   List of dedicated fax numbers for the Facilities:  [address] DENIALS (Administrative/Medical Necessity) [phone]   [address] (Maternity/NICU/Pediatrics) [phone]   Prem Tomlinson [phone]   Cole Rome [phone]   Susy Black [phone]   Mariluz Quiroga [phone]   [address] [place] [address] [address] [place] [address] [phone]   [facility]  [phone]   [address] [facility] [address]  [address] [facility] [address] [facility] [phone]

## 2020-07-13 NOTE — ASSESSMENT & PLAN NOTE
Presented ED with blood pressure 207/113 with pain med improved to 164/82  · Blood pressure currently mildly elevated with systolics in the 970V  · Continue to suspect in relation to pain  · He was on lisinopril in the past which was discontinued because his blood pressure was well controlled  · Status post IV hydralazine x1 and IV labetalol x1  · Continue to monitor with routine vitals

## 2020-07-13 NOTE — PROGRESS NOTES
PCA attempted to get bloodwork but when she was unable to and asked to try again the patient refused  I entered the room and asked if I could get his morning labs instead and he said "no I do not want bloodwork this morning " Joanne/JENNIFER notified that patient is refusing  Will attempt to get bloodwork later this morning

## 2020-07-13 NOTE — DISCHARGE SUMMARY
Discharge- Myah Hagen 1970, 51 y o  male MRN: [id number]    Unit/Bed#: -01 Encounter: [id number]    Primary Care Provider: Aniket Raymundo MD   Date and time admitted to hospital: 7/11/2020  2:47 PM    * Nephrolithiasis  Assessment & Plan  Background: Patient presented with left groin pain that radiated to left flank pain  Transferred from [address] [place] [address] for urology evaluation  · CT showed " 2 adjacent calculi measuring 3 mm and 2 mm in the left distal ureter causing mild hydroureteronephrosis with perinephric stranding  Tiny nonobstructing right intrarenal calculus"  · Scrotal ultrasound done due to left scrotal pain revealing "normal testes, bilateral varicoceles "  · UA negative for leukocytes, nitrate or bacteria, showed trace blood  · Patient remains afebrile, white blood cell count 10  · Disontinued IV fluids; encouraged continue oral hydration  · Initiated Tamsulosin  · Patient on Suboxone and would not like to come off of it  Refuses opioids      · Tylenol Q6hr scheduled   · Toradol 15 mg Q6hr PRN; however cautious due to DIVYA; discontinued on 7/12 secondary to adequate pain control   · Urology consulted; input appreciated  · Status post cystoscopy ureteroscopy, retrograde pyelogram and insertion of left ureteral stent  · Stents remain in place for approximately 10-14 days  · CT scan can be obtained if the stones passed spontaneously around the stent prior to considering repeat ureteroscopy  · Advised to call Dr Izzy Rivera office for appointment scheduling         Acute kidney injury [facility])  Assessment & Plan  · Trended down appropriately to 1 29 status post IV fluids  · Likely secondary to kidney stones/stricture  · Creatinine 1 39, in [address] last creatinine level was were 0 89  · Baseline difficult to assess not very many values to average  · Continue IV fluids  · Patient refuses opioids, for pain management started on Toradol every 6 hours PRN  · Refusing blood work on 07/13    Hypertensive urgency  Assessment & Plan  Presented ED with blood pressure 207/113 with pain med improved to 164/82  · Blood pressure currently mildly elevated with systolics in the 372C  · Continue to suspect in relation to pain  · He was on lisinopril in the past which was discontinued because his blood pressure was well controlled  · Status post IV hydralazine x1 and IV labetalol x1  · Continue to monitor with routine vitals    Opioid dependence in remission [facility])  Assessment & Plan  · Patient has Suboxone, does not want a stop taking it  · Refuses opioids  · Requested to give a call to Dr Gideon Whitlock [phone] with managing Suboxone to discuss pain management; voicemail was left by admitting physician  Will defer any contact to weekday shift if pain unable to be controlled on current regimen     Transaminitis  Assessment & Plan  · AST 60, ALT 83, phos 65  · Highly suspect somewhat chronic in nature due to imaging findings   · CT showed hepatomegaly with fatty infiltration  · Patient denies right upper pain    Discharging Physician / Practitioner: LEENA Flor  PCP: Namrata Comer MD  Admission Date:   Admission Orders (From admission, onward)     Ordered        07/12/20 0859  Inpatient Admission  Once         07/11/20 1540  Place in Observation  Once                   Discharge Date: 07/13/20    Resolved Problems  Date Reviewed: 7/13/2020    None          Consultations During Hospital Stay:  · Neurology    Procedures Performed:   · Urine is copy with cystoscopy and pyelogram with stent placement  · CT abdomen pelvis with contrast  · Ultrasound scrotum and testicles  · FL retrograde pyelogram    Significant Findings / Test Results:   · As noted above    Incidental Findings:   · None     Test Results Pending at Discharge (will require follow up): · None     Outpatient Tests Requested:  · None    Complications:  None    Reason for Admission:  Nephrolithiasis    Hospital Course:     Daniel Gibson is a 51 y o  male patient past medical history of chronic pain, GERD, and HTN who originally presented to the hospital on 7/11/2020 due to severe left-sided flank pain  He initially presented to [address] [place] [address] however was transferred for urological evaluation  CT did show 2 adjacent calculi in the left distal ureter causing mild hydroureteronephrosis with perinephric stranding  Scrotal ultrasound unremarkable  UA negative  Urology did see and evaluate him and recommended encourage discopathy with cystoscopy pyelogram and ended up placing a left ureteral stent  Will need to be removed in 10-14 days  Will follow-up in clinic with Urology as an outpatient  Can decide at that time whether a CT scan needs to be done  Patient did have a noted acute kidney injury however patient refusing all blood work  Will recommend to get blood work and 4 days following discharge  Recommend increasing oral intake and fluid intake to assist with adequate hydration and limit stent colic  Patient was also notably hypertensive however highly suspect secondary to pain relation  Recommend checking blood pressure at home and following up with primary care provider for further recommendations and possible re-initiation of lisinopril that was discontinued in the past     Plan of care discussed with patient at length and will be seen at [address] [place] [address] office in the near future  Please see above list of diagnoses and related plan for additional information  Condition at Discharge: stable     Discharge Day Visit / Exam:     Subjective:  Patient does endorse pain with urination however reports that is manageable at this time  Vitals: Blood Pressure: (!) 153/103 (07/13/20 0829)  Pulse: 74 (07/13/20 0829)  Temperature: 98 9 °F (37 2 °C) (07/13/20 0829)  Temp Source: Oral (07/13/20 0829)  Respirations: 20 (07/13/20 0829)  SpO2: 98 % (07/13/20 0829)    Exam:   Physical Exam   Constitutional: He is oriented to person, place, and time   He appears well-nourished  He is cooperative  No distress  Cardiovascular: Normal rate, regular rhythm, normal heart sounds and intact distal pulses  No murmur heard  Pulses:       Radial pulses are 2+ on the right side, and 2+ on the left side  Dorsalis pedis pulses are 2+ on the right side, and 2+ on the left side  Posterior tibial pulses are 2+ on the right side, and 2+ on the left side  No peripheral edema noted  Pulmonary/Chest: Effort normal and breath sounds normal  No respiratory distress  He has no wheezes  Abdominal: Soft  Bowel sounds are normal  He exhibits no distension  There is no tenderness  Neurological: He is alert and oriented to person, place, and time  Skin: Skin is warm and dry  Capillary refill takes less than 2 seconds  Psychiatric: He has a normal mood and affect  His speech is normal and behavior is normal  Judgment normal    Vitals reviewed  Discharge instructions/Information to patient and family:   See after visit summary for information provided to patient and family  Provisions for Follow-Up Care:  See after visit summary for information related to follow-up care and any pertinent home health orders  Disposition:     Home       Discharge Statement:  I spent 45 minutes discharging the patient  This time was spent on the day of discharge  I had direct contact with the patient on the day of discharge  Greater than 50% of the total time was spent examining patient, answering all patient questions, arranging and discussing plan of care with patient as well as directly providing post-discharge instructions  Additional time then spent on discharge activities  Discharge Medications:  See after visit summary for reconciled discharge medications provided to patient and family        ** Please Note: This note has been constructed using a voice recognition system **

## 2020-07-13 NOTE — DISCHARGE INSTRUCTIONS
How to Strain Your Urine   WHAT YOU NEED TO KNOW:   Urinate into a strainer (funnel with a fine mesh on the bottom) or glass jar to collect kidney stones  DISCHARGE INSTRUCTIONS:   Medicines:   · Pain medicine: You may be given medicine to take away or decrease pain  Do not wait until the pain is severe before you take your medicine  · NSAIDs:  These medicines decrease swelling, pain, and fever  NSAIDs are available without a doctor's order  Ask which medicine is right for you  Ask how much to take and when to take it  Take as directed  NSAIDs can cause stomach bleeding and kidney problems if not taken correctly  · Nausea medicine: This medicine calms your stomach and prevents or controls vomiting  · Take your medicine as directed  Contact your healthcare provider if you think your medicine is not helping or if you have side effects  Tell him of her if you are allergic to any medicine  Keep a list of the medicines, vitamins, and herbs you take  Include the amounts, and when and why you take them  Bring the list or the pill bottles to follow-up visits  Carry your medicine list with you in case of an emergency  Drink liquids as directed:  Drink about 3 liters of liquids each day, or as directed  That equals about 12 glasses of water or fruit juice  Half of your total daily liquids should be water  Limit coffee, tea, and soda to 2 cups daily  Your urine will be pale and clear if you are drinking enough liquid  Self-care:   · Activity:  Exercise, such as walking, may help decrease your pain  · Avoid heat:  Heat may cause you to sweat, urinate less, and become dehydrated  Follow up with your healthcare provider or urologist as directed:  Write down your questions so you remember to ask them during your visits  Contact your healthcare provider or urologist if:   · You have a fever and chills  · Your urine looks cloudy or has a bad smell  · You have burning pain when you urinate  · You have trouble urinating  · You are vomiting and it does not get better, even after you take medicine  · You have questions or concerns about your condition or care  Return to the emergency department if:   · You are not able to urinate  · You have severe pain in your lower abdomen or side  · Your heart flutters or beats faster than usual   © 2017 [employer] [employer] Information is for End User's use only and may not be sold, redistributed or otherwise used for commercial purposes  All illustrations and images included in CareNotes® are the copyrighted property of A [employer] M [employer] Inc  or [employer] Mer[employer]  The above information is an  only  It is not intended as medical advice for individual conditions or treatments  Talk to your doctor, nurse or pharmacist before following any medical regimen to see if it is safe and effective for you  Kidney Stones   WHAT YOU NEED TO KNOW:   Kidney stones form in the urinary system when the water and waste in your urine are out of balance  When this happens, certain types of waste crystals separate from the urine  The crystals build up and form kidney stones  You may have 1 or more kidney stones  DISCHARGE INSTRUCTIONS:   Seek care immediately:   · You have vomiting that is not relieved by medicine  Contact your healthcare provider if:   · You have a fever  · You have trouble passing urine  · You see blood in your urine  · You have severe pain  · You have any questions or concerns about your condition or care  Medicines:   · NSAIDs , such as ibuprofen, help decrease swelling, pain, and fever  This medicine is available with or without a doctor's order  NSAIDs can cause stomach bleeding or kidney problems in certain people  If you take blood thinner medicine, always ask your healthcare provider if NSAIDs are safe for you  Always read the medicine label and follow directions      · Prescription medicine  may be given  Ask how to take this medicine safely  · Medicines  to balance your electrolytes may be needed  · Take your medicine as directed  Contact your healthcare provider if you think your medicine is not helping or if you have side effects  Tell him or her if you are allergic to any medicine  Keep a list of the medicines, vitamins, and herbs you take  Include the amounts, and when and why you take them  Bring the list or the pill bottles to follow-up visits  Carry your medicine list with you in case of an emergency  Follow up with your healthcare provider as directed: You may need to return for more tests  Write down your questions so you remember to ask them during your visits  Self-care:   · Drink plenty of liquids  Your healthcare provider may tell you to drink at least 8 to 12 (eight-ounce) cups of liquids each day  This helps flush out the kidney stones when you urinate  Water is the best liquid to drink  · Strain your urine every time you go to the bathroom  Urinate through a strainer or a piece of thin cloth to catch the stones  Take the stones to your healthcare provider so they can be sent to the lab for tests  This will help your healthcare providers plan the best treatment for you  · Eat a variety of healthy foods  Healthy foods include fruits, vegetables, whole-grain breads, low-fat dairy products, beans, and fish  You may need to limit how much sodium (salt) or protein you eat  Ask for information about the best foods for you  · Stay active  Your stones may pass more easily by if you stay active  Ask about the best activities for you  After you pass your kidney stones:  Once you have passed your kidney stones, your healthcare provider may  order a 24-hour urine test  Results from a 24-hour urine test will help your healthcare provider plan ways to prevent more stones from forming   If you are told to do a 24-hour test, your healthcare provider will give you more instructions  © 2017 [address] [facility] Information is for End User's use only and may not be sold, redistributed or otherwise used for commercial purposes  All illustrations and images included in CareNotes® are the copyrighted property of A D A M , Inc  or Lencho Mercer  The above information is an  only  It is not intended as medical advice for individual conditions or treatments  Talk to your doctor, nurse or pharmacist before following any medical regimen to see if it is safe and effective for you

## 2020-07-13 NOTE — PLAN OF CARE
Problem: PAIN - ADULT  Goal: Verbalizes/displays adequate comfort level or baseline comfort level  Description  Interventions:  - Encourage patient to monitor pain and request assistance  - Assess pain using appropriate pain scale  - Administer analgesics based on type and severity of pain and evaluate response  - Implement non-pharmacological measures as appropriate and evaluate response  - Consider cultural and social influences on pain and pain management  - Notify physician/advanced practitioner if interventions unsuccessful or patient reports new pain  Outcome: Progressing     Problem: INFECTION - ADULT  Goal: Absence or prevention of progression during hospitalization  Description  INTERVENTIONS:  - Assess and monitor for signs and symptoms of infection  - Monitor lab/diagnostic results  - Monitor all insertion sites, i e  indwelling lines, tubes, and drains  - Monitor endotracheal if appropriate and nasal secretions for changes in amount and color  - Range appropriate cooling/warming therapies per order  - Administer medications as ordered  - Instruct and encourage patient and family to use good hand hygiene technique  - Identify and instruct in appropriate isolation precautions for identified infection/condition  Outcome: Progressing  Goal: Absence of fever/infection during neutropenic period  Description  INTERVENTIONS:  - Monitor WBC    Outcome: Progressing     Problem: SAFETY ADULT  Goal: Patient will remain free of falls  Description  INTERVENTIONS:  - Assess patient frequently for physical needs  -  Identify cognitive and physical deficits and behaviors that affect risk of falls    -  Range fall precautions as indicated by assessment   - Educate patient/family on patient safety including physical limitations  - Instruct patient to call for assistance with activity based on assessment  - Modify environment to reduce risk of injury  - Consider OT/PT consult to assist with strengthening/mobility  Outcome: Progressing  Goal: Maintain or return to baseline ADL function  Description  INTERVENTIONS:  -  Assess patient's ability to carry out ADLs; assess patient's baseline for ADL function and identify physical deficits which impact ability to perform ADLs (bathing, care of mouth/teeth, toileting, grooming, dressing, etc )  - Assess/evaluate cause of self-care deficits   - Assess range of motion  - Assess patient's mobility; develop plan if impaired  - Assess patient's need for assistive devices and provide as appropriate  - Encourage maximum independence but intervene and supervise when necessary  - Involve family in performance of ADLs  - Assess for home care needs following discharge   - Consider OT consult to assist with ADL evaluation and planning for discharge  - Provide patient education as appropriate  Outcome: Progressing  Goal: Maintain or return mobility status to optimal level  Description  INTERVENTIONS:  - Assess patient's baseline mobility status (ambulation, transfers, stairs, etc )    - Identify cognitive and physical deficits and behaviors that affect mobility  - Identify mobility aids required to assist with transfers and/or ambulation (gait belt, sit-to-stand, lift, walker, cane, etc )  - [place] fall precautions as indicated by assessment  - Record patient progress and toleration of activity level on Mobility SBAR; progress patient to next Phase/Stage  - Instruct patient to call for assistance with activity based on assessment  - Consider rehabilitation consult to assist with strengthening/weightbearing, etc   Outcome: Progressing     Problem: DISCHARGE PLANNING  Goal: Discharge to home or other facility with appropriate resources  Description  INTERVENTIONS:  - Identify barriers to discharge w/patient and caregiver  - Arrange for needed discharge resources and transportation as appropriate  - Identify discharge learning needs (meds, wound care, etc )  - Arrange for interpretive services to assist at discharge as needed  - Refer to Case Management Department for coordinating discharge planning if the patient needs post-hospital services based on physician/advanced practitioner order or complex needs related to functional status, cognitive ability, or social support system  Outcome: Progressing     Problem: Knowledge Deficit  Goal: Patient/family/caregiver demonstrates understanding of disease process, treatment plan, medications, and discharge instructions  Description  Complete learning assessment and assess knowledge base    Interventions:  - Provide teaching at level of understanding  - Provide teaching via preferred learning methods  Outcome: Progressing

## 2020-07-13 NOTE — TELEPHONE ENCOUNTER
Patient had attempted ureteroscopy today  A stent was left in place  He will need a CT scan in approximately 10 days and a follow-up visit after this to discuss stent removal verses repeat ureteroscopy  Please call to arrange  I think he will be discharged on July 13th

## 2020-07-13 NOTE — ASSESSMENT & PLAN NOTE
· Patient has Suboxone, does not want a stop taking it  · Refuses opioids  · Requested to give a call to Dr Carmencita Elizondo [phone] with managing Suboxone to discuss pain management; voicemail was left by admitting physician   Will defer any contact to weekday shift if pain unable to be controlled on current regimen

## 2020-07-14 NOTE — TELEPHONE ENCOUNTER
LVM to call and schedule CT and office FU/number for central scheduling given if patient wants to schedule directly

## 2020-07-20 DIAGNOSIS — N20.0 NEPHROLITHIASIS: Primary | ICD-10-CM

## 2020-07-20 NOTE — TELEPHONE ENCOUNTER
LVM to call and schedule CT and office FU/number for central scheduling given for patient to schedule directly at his convenience

## 2020-07-20 NOTE — TELEPHONE ENCOUNTER
Patient called to advise no cat scan order in his chart and he was unable to schedule  Patient requesting call back  Patient states stent is hurting and he started to urinate blood again yesterday  Please advise

## 2020-07-20 NOTE — TELEPHONE ENCOUNTER
Pt called to schedule stent removal, I informed him of Elmer Valerio instructions,pt states he did not get vm message,I gave him Central Scheduling # [phone] once scheduled call back for office appointment

## 2020-07-20 NOTE — TELEPHONE ENCOUNTER
Call placed to Pt and left message that the order for CT Scan was placed in Epic and per Dr Delfina Live recommendations drink a lot of water and take Ibuprofen for pain

## 2020-07-21 ENCOUNTER — NURSE TRIAGE (OUTPATIENT)
Dept: OTHER | Facility: OTHER | Age: 50
End: 2020-07-21

## 2020-07-21 DIAGNOSIS — N20.0 CALCULUS OF KIDNEY: Primary | ICD-10-CM

## 2020-07-21 DIAGNOSIS — N20.0 NEPHROLITHIASIS: Primary | ICD-10-CM

## 2020-07-21 DIAGNOSIS — N32.89 BLADDER SPASMS: ICD-10-CM

## 2020-07-21 RX ORDER — OXYBUTYNIN CHLORIDE 5 MG/1
5 TABLET ORAL 3 TIMES DAILY
Qty: 30 TABLET | Refills: 1 | Status: SHIPPED | OUTPATIENT
Start: 2020-07-21

## 2020-07-21 NOTE — TELEPHONE ENCOUNTER
Outreach to on call provider  Pt instructed to hydrate, take ibuprofen   On call provider will order oxybutynin  Instructions reviewed with patient  Advised to continue to monitor for any additional bleeding  Pt states he will go to ED if he has any additional symptoms       Reason for Disposition   Side (flank) or back pain present    Protocols used: URINE - BLOOD IN-ADULT-AH

## 2020-07-21 NOTE — TELEPHONE ENCOUNTER
Answer Assessment - Initial Assessment Questions  1  COLOR of URINE: "Describe the color of the urine "  (e g , tea-colored, pink, red, blood clots, bloody)      Blood in urine post stent  Tea colored to bright red  2  ONSET: "When did the bleeding start?"       Nearly 2 weeks ago  3  EPISODES: "How many times has there been blood in the urine?" or "How many times today?"     Quite a bit  Today increased activity  Urine became brighter red  4  PAIN with URINATION: "Is there any pain with passing your urine?" If so, ask: "How bad is the pain?"  (Scale 1-10; or mild, moderate, severe)     - MILD - complains slightly about urination hurting     - MODERATE - interferes with normal activities       - SEVERE - excruciating, unwilling or unable to urinate because of the pain       Pain near bladder area  4    5  FEVER: "Do you have a fever?" If so, ask: "What is your temperature, how was it measured, and when did it start?"      No   6  ASSOCIATED SYMPTOMS: "Are you passing urine more frequently than usual?"      No  7   OTHER SYMPTOMS: "Do you have any other symptoms?" (e g , back/flank pain, abdominal pain, vomiting)     Very painful to urinate  " Brings me my knees"    Protocols used: URINE - BLOOD IN-ADULT-

## 2020-07-21 NOTE — TELEPHONE ENCOUNTER
Regarding: disloged stent   ----- Message from Nell Ortega sent at 7/21/2020  5:47 PM EDT -----  " I have stent placed and I noticed a significant amount of bl;ood is coming out and I don't think that's normal  Plus it hurts so much I don't want to urinate"

## 2020-07-22 ENCOUNTER — APPOINTMENT (EMERGENCY)
Dept: CT IMAGING | Facility: HOSPITAL | Age: 50
End: 2020-07-22
Payer: COMMERCIAL

## 2020-07-22 ENCOUNTER — HOSPITAL ENCOUNTER (EMERGENCY)
Facility: HOSPITAL | Age: 50
Discharge: HOME | End: 2020-07-22
Attending: EMERGENCY MEDICINE | Admitting: EMERGENCY MEDICINE
Payer: COMMERCIAL

## 2020-07-22 ENCOUNTER — TELEPHONE (OUTPATIENT)
Dept: UROLOGY | Facility: MEDICAL CENTER | Age: 50
End: 2020-07-22

## 2020-07-22 VITALS
BODY MASS INDEX: 44.1 KG/M2 | OXYGEN SATURATION: 99 % | RESPIRATION RATE: 18 BRPM | WEIGHT: 315 LBS | HEART RATE: 95 BPM | TEMPERATURE: 96.6 F | HEIGHT: 71 IN | SYSTOLIC BLOOD PRESSURE: 145 MMHG | DIASTOLIC BLOOD PRESSURE: 78 MMHG

## 2020-07-22 DIAGNOSIS — N39.0 URINARY TRACT INFECTION: ICD-10-CM

## 2020-07-22 DIAGNOSIS — R31.0 GROSS HEMATURIA: Primary | ICD-10-CM

## 2020-07-22 LAB
ALBUMIN SERPL BCP-MCNC: 3.9 G/DL (ref 3.5–5)
ALP SERPL-CCNC: 172 U/L (ref 46–116)
ALT SERPL W P-5'-P-CCNC: 74 U/L (ref 12–78)
ANION GAP SERPL CALCULATED.3IONS-SCNC: 7 MMOL/L (ref 4–13)
APTT PPP: 28 SECONDS (ref 23–37)
AST SERPL W P-5'-P-CCNC: 46 U/L (ref 5–45)
BACTERIA UR QL AUTO: ABNORMAL /HPF
BASOPHILS # BLD AUTO: 0.05 THOUSANDS/ΜL (ref 0–0.1)
BASOPHILS NFR BLD AUTO: 1 % (ref 0–1)
BILIRUB SERPL-MCNC: 0.22 MG/DL (ref 0.2–1)
BILIRUB UR QL STRIP: ABNORMAL
BUN SERPL-MCNC: 11 MG/DL (ref 5–25)
CALCIUM SERPL-MCNC: 8.8 MG/DL (ref 8.3–10.1)
CHLORIDE SERPL-SCNC: 102 MMOL/L (ref 100–108)
CLARITY UR: ABNORMAL
CO2 SERPL-SCNC: 29 MMOL/L (ref 21–32)
COLOR UR: ABNORMAL
CREAT SERPL-MCNC: 1.18 MG/DL (ref 0.6–1.3)
EOSINOPHIL # BLD AUTO: 0.25 THOUSAND/ΜL (ref 0–0.61)
EOSINOPHIL NFR BLD AUTO: 4 % (ref 0–6)
ERYTHROCYTE [DISTWIDTH] IN BLOOD BY AUTOMATED COUNT: 13.3 % (ref 11.6–15.1)
GFR SERPL CREATININE-BSD FRML MDRD: 73 ML/MIN/1.73SQ M
GLUCOSE SERPL-MCNC: 161 MG/DL (ref 65–140)
GLUCOSE UR STRIP-MCNC: NEGATIVE MG/DL
HCT VFR BLD AUTO: 41.1 % (ref 36.5–49.3)
HGB BLD-MCNC: 13.4 G/DL (ref 12–17)
HGB UR QL STRIP.AUTO: ABNORMAL
IMM GRANULOCYTES # BLD AUTO: 0.07 THOUSAND/UL (ref 0–0.2)
IMM GRANULOCYTES NFR BLD AUTO: 1 % (ref 0–2)
INR PPP: 0.97 (ref 0.84–1.19)
KETONES UR STRIP-MCNC: ABNORMAL MG/DL
LEUKOCYTE ESTERASE UR QL STRIP: ABNORMAL
LYMPHOCYTES # BLD AUTO: 1.67 THOUSANDS/ΜL (ref 0.6–4.47)
LYMPHOCYTES NFR BLD AUTO: 26 % (ref 14–44)
MCH RBC QN AUTO: 26.4 PG (ref 26.8–34.3)
MCHC RBC AUTO-ENTMCNC: 32.6 G/DL (ref 31.4–37.4)
MCV RBC AUTO: 81 FL (ref 82–98)
MONOCYTES # BLD AUTO: 0.88 THOUSAND/ΜL (ref 0.17–1.22)
MONOCYTES NFR BLD AUTO: 14 % (ref 4–12)
NEUTROPHILS # BLD AUTO: 3.56 THOUSANDS/ΜL (ref 1.85–7.62)
NEUTS SEG NFR BLD AUTO: 54 % (ref 43–75)
NITRITE UR QL STRIP: POSITIVE
NON-SQ EPI CELLS URNS QL MICRO: ABNORMAL /HPF
NRBC BLD AUTO-RTO: 0 /100 WBCS
PH UR STRIP.AUTO: 6.5 [PH]
PLATELET # BLD AUTO: 279 THOUSANDS/UL (ref 149–390)
PMV BLD AUTO: 9.4 FL (ref 8.9–12.7)
POTASSIUM SERPL-SCNC: 3.8 MMOL/L (ref 3.5–5.3)
PROT SERPL-MCNC: 7.5 G/DL (ref 6.4–8.2)
PROT UR STRIP-MCNC: >=300 MG/DL
PROTHROMBIN TIME: 12.9 SECONDS (ref 11.6–14.5)
RBC # BLD AUTO: 5.07 MILLION/UL (ref 3.88–5.62)
RBC #/AREA URNS AUTO: ABNORMAL /HPF
SODIUM SERPL-SCNC: 138 MMOL/L (ref 136–145)
SP GR UR STRIP.AUTO: >=1.03 (ref 1–1.03)
UROBILINOGEN UR QL STRIP.AUTO: 1 E.U./DL
WBC # BLD AUTO: 6.48 THOUSAND/UL (ref 4.31–10.16)
WBC #/AREA URNS AUTO: ABNORMAL /HPF

## 2020-07-22 PROCEDURE — 87086 URINE CULTURE/COLONY COUNT: CPT | Performed by: EMERGENCY MEDICINE

## 2020-07-22 PROCEDURE — 96365 THER/PROPH/DIAG IV INF INIT: CPT

## 2020-07-22 PROCEDURE — 80053 COMPREHEN METABOLIC PANEL: CPT | Performed by: EMERGENCY MEDICINE

## 2020-07-22 PROCEDURE — 96375 TX/PRO/DX INJ NEW DRUG ADDON: CPT

## 2020-07-22 PROCEDURE — 81001 URINALYSIS AUTO W/SCOPE: CPT | Performed by: EMERGENCY MEDICINE

## 2020-07-22 PROCEDURE — 85025 COMPLETE CBC W/AUTO DIFF WBC: CPT | Performed by: EMERGENCY MEDICINE

## 2020-07-22 PROCEDURE — 96361 HYDRATE IV INFUSION ADD-ON: CPT

## 2020-07-22 PROCEDURE — 36415 COLL VENOUS BLD VENIPUNCTURE: CPT | Performed by: EMERGENCY MEDICINE

## 2020-07-22 PROCEDURE — 85730 THROMBOPLASTIN TIME PARTIAL: CPT | Performed by: EMERGENCY MEDICINE

## 2020-07-22 PROCEDURE — 74176 CT ABD & PELVIS W/O CONTRAST: CPT

## 2020-07-22 PROCEDURE — 85610 PROTHROMBIN TIME: CPT | Performed by: EMERGENCY MEDICINE

## 2020-07-22 PROCEDURE — 99284 EMERGENCY DEPT VISIT MOD MDM: CPT

## 2020-07-22 PROCEDURE — 99284 EMERGENCY DEPT VISIT MOD MDM: CPT | Performed by: EMERGENCY MEDICINE

## 2020-07-22 RX ORDER — KETOROLAC TROMETHAMINE 30 MG/ML
15 INJECTION, SOLUTION INTRAMUSCULAR; INTRAVENOUS ONCE
Status: COMPLETED | OUTPATIENT
Start: 2020-07-22 | End: 2020-07-22

## 2020-07-22 RX ORDER — CEFTRIAXONE 1 G/50ML
1000 INJECTION, SOLUTION INTRAVENOUS ONCE
Status: COMPLETED | OUTPATIENT
Start: 2020-07-22 | End: 2020-07-22

## 2020-07-22 RX ORDER — PHENAZOPYRIDINE HYDROCHLORIDE 200 MG/1
200 TABLET, FILM COATED ORAL 3 TIMES DAILY
Qty: 20 TABLET | Refills: 0 | Status: SHIPPED | OUTPATIENT
Start: 2020-07-22

## 2020-07-22 RX ORDER — CEPHALEXIN 500 MG/1
500 CAPSULE ORAL EVERY 12 HOURS SCHEDULED
Qty: 20 CAPSULE | Refills: 0 | Status: SHIPPED | OUTPATIENT
Start: 2020-07-22 | End: 2020-08-01

## 2020-07-22 RX ORDER — PHENAZOPYRIDINE HYDROCHLORIDE 100 MG/1
100 TABLET, FILM COATED ORAL ONCE
Status: COMPLETED | OUTPATIENT
Start: 2020-07-22 | End: 2020-07-22

## 2020-07-22 RX ADMIN — KETOROLAC TROMETHAMINE 15 MG: 30 INJECTION, SOLUTION INTRAMUSCULAR at 01:38

## 2020-07-22 RX ADMIN — CEFTRIAXONE 1000 MG: 1 INJECTION, SOLUTION INTRAVENOUS at 02:43

## 2020-07-22 RX ADMIN — SODIUM CHLORIDE 1000 ML: 0.9 INJECTION, SOLUTION INTRAVENOUS at 01:42

## 2020-07-22 RX ADMIN — PHENAZOPYRIDINE 100 MG: 100 TABLET ORAL at 03:03

## 2020-07-22 NOTE — TELEPHONE ENCOUNTER
Per previous note from Dr Edu Oquendo patient to have a follow-up appointment scheduled for discussion of plan of care with him I do not see that that has been schedule    Please have appointment scheduled with Dr Lonnie Barney

## 2020-07-22 NOTE — TELEPHONE ENCOUNTER
[id number] [facility] IR Dept called stating pt was transferred to their dept by mistake from  he became upset trying to reach Urology and hung up,she is asking clinical to contact pt directly

## 2020-07-22 NOTE — TELEPHONE ENCOUNTER
Pt called into the office  I spoke with him and informed him that Dr Edu Oquendo will need to review CT scan and determine appropriate follow up in regards to stent at this time  Pt is aware, but is frustrated and feels like he is not getting the answers he wants  I informed patient that testing needs to be reviewed before stent can be removed based on recommendations of provider at this time and provider is not in the office today  He understood and will await a call back from the office with further recommendations at this time

## 2020-07-22 NOTE — TELEPHONE ENCOUNTER
Call placed to patient and spoke with him  He informed me that he was in the ER this morning  He had c/o of blood in his urine and increased pain  Pt was discharged from the ER today and is calling to inquire if stent can be removed  According to Dr Eddie Mendes note on 7/12/2020 when patient had CYSTOSCOPY URETEROSCOPY , RETROGRADE PYELOGRAM AND INSERTION STENT URETERAL (Left) performed, he recommended that patient have CT scan done prior to having stent removed secondary to patient needing repeat surgery  Informed patient that I will have provider review CT scan that was done in the ER today to determine plan of care with stent  He is aware and will await a call back from our office in regards to stent  Routing to provider for further review  ,

## 2020-07-22 NOTE — ED PROVIDER NOTES
History  Chief Complaint   Patient presents with    Blood in Urine     pt  had 2 kidney stones and had a stent placed on 7/9, pt  c/o gross hematuria, pain, called urologuy and was told to increase fluid intake , pt  was to have CT scan today to see if stent can be removed     Patient is a 56-year-old presenting to the emergency department today complaining of dysuria and hematuria worsening over the past few days, patient has a history of kidney stones and was treated at [facility] in [place] on 07/11 with cystoscopy and ureteral stent placement, he has no knowledge of passing any stones at this point in time, he is passing large amounts of blood with clots, and has the sensation as if he is urinating razor blades, he denies any nausea or vomiting, no diarrhea, no fever or chills          Prior to Admission Medications   Prescriptions Last Dose Informant Patient Reported? Taking? buprenorphine-naloxone (SUBOXONE) 8-2 mg per SL tablet  Self Yes No   Sig: Place 1 tablet under the tongue daily   oxybutynin (DITROPAN) 5 mg tablet   No No   Sig: Take 1 tablet (5 mg total) by mouth 3 (three) times a day   pantoprazole (PROTONIX) 40 mg tablet  Self Yes No   Sig: Take 40 mg by mouth daily   tamsulosin (FLOMAX) 0 4 mg   No No   Sig: Take 1 capsule (0 4 mg total) by mouth daily with dinner      Facility-Administered Medications: None       Past Medical History:   Diagnosis Date    Back pain     Chronic GERD        Past Surgical History:   Procedure Laterality Date    FL RETROGRADE PYELOGRAM  7/12/2020    OR CYSTO/URETERO W/LITHOTRIPSY &INDWELL STENT INSRT Left 7/12/2020    Procedure: CYSTOSCOPY URETEROSCOPY , RETROGRADE PYELOGRAM AND INSERTION STENT URETERAL;  Surgeon: Marisela Rojas MD;  [facility];  Service: Urology       History reviewed  No pertinent family history  I have reviewed and agree with the history as documented      E-Cigarette/Vaping    E-Cigarette Use Never User E-Cigarette/Vaping Substances    Nicotine No     THC No     CBD No     Flavoring No      Social History     Tobacco Use    Smoking status: Unknown If Ever Smoked    Smokeless tobacco: Never Used   Substance Use Topics    Alcohol use: Never     Frequency: Never    Drug use: Never       Review of Systems   Constitutional: Negative  HENT: Negative  Eyes: Negative  Respiratory: Negative  Cardiovascular: Negative  Gastrointestinal: Negative  Endocrine: Negative  Genitourinary: Positive for dysuria and hematuria  Musculoskeletal: Negative  Skin: Negative  Allergic/Immunologic: Negative  Neurological: Negative  Hematological: Negative  Psychiatric/Behavioral: Negative  Physical Exam  Physical Exam   Constitutional: He is oriented to person, place, and time  He appears well-developed and well-nourished  HENT:   Head: Normocephalic and atraumatic  Eyes: Pupils are equal, round, and reactive to light  Conjunctivae and EOM are normal    Neck: Normal range of motion  Neck supple  Cardiovascular: Normal rate  Pulmonary/Chest: Effort normal    Abdominal: Soft  Musculoskeletal: Normal range of motion  Neurological: He is alert and oriented to person, place, and time  Skin: Skin is warm and dry  Psychiatric: He has a normal mood and affect         Vital Signs  ED Triage Vitals [07/22/20 0129]   Temperature Pulse Respirations Blood Pressure SpO2   (!) 96 6 °F (35 9 °C) 96 18 168/93 96 %      Temp Source Heart Rate Source Patient Position - Orthostatic VS BP Location FiO2 (%)   Temporal Monitor Sitting Right arm --      Pain Score       4           Vitals:    07/22/20 0129   BP: 168/93   Pulse: 96   Patient Position - Orthostatic VS: Sitting             ED Medications  Medications   cefTRIAXone (ROCEPHIN) IVPB (premix) 1,000 mg 50 mL (1,000 mg Intravenous New Bag 7/22/20 0243)   phenazopyridine (PYRIDIUM) tablet 100 mg (has no administration in time range) sodium chloride 0 9 % bolus 1,000 mL (1,000 mL Intravenous New Bag 7/22/20 0142)   ketorolac (TORADOL) injection 15 mg (15 mg Intravenous Given 7/22/20 0138)       Diagnostic Studies  Results Reviewed     Procedure Component Value Units Date/Time    Comprehensive metabolic panel [[id number]]  (Abnormal) Collected:  07/22/20 0138    Lab Status:  Final result Specimen:  Blood from Arm, Right Updated:  07/22/20 0210     Sodium 138 mmol/L      Potassium 3 8 mmol/L      Chloride 102 mmol/L      CO2 29 mmol/L      ANION GAP 7 mmol/L      BUN 11 mg/dL      Creatinine 1 18 mg/dL      Glucose 161 mg/dL      Calcium 8 8 mg/dL      AST 46 U/L      ALT 74 U/L      Alkaline Phosphatase 172 U/L      Total Protein 7 5 g/dL      Albumin 3 9 g/dL      Total Bilirubin 0 22 mg/dL      eGFR 73 ml/min/1 73sq m     Narrative:       [place] guidelines for Chronic Kidney Disease (CKD):     Stage 1 with normal or high GFR (GFR > 90 mL/min/1 73 square meters)    Stage 2 Mild CKD (GFR = 60-89 mL/min/1 73 square meters)    Stage 3A Moderate CKD (GFR = 45-59 mL/min/1 73 square meters)    Stage 3B Moderate CKD (GFR = 30-44 mL/min/1 73 square meters)    Stage 4 Severe CKD (GFR = 15-29 mL/min/1 73 square meters)    Stage 5 End Stage CKD (GFR <15 mL/min/1 73 square meters)  Note: GFR calculation is accurate only with a steady state creatinine    Urine Microscopic [[id number]]  (Abnormal) Collected:  07/22/20 0147    Lab Status:  Final result Specimen:  Urine, Clean Catch Updated:  07/22/20 0205     RBC, UA Innumerable /hpf      WBC, UA       Field obscured, unable to enumerate     /hpf     Epithelial Cells       Field obscured, unable to enumerate     /hpf     Bacteria, UA None Seen /hpf     Urine culture [[id number]] Collected:  07/22/20 0147    Lab Status:   In process Specimen:  Urine, Clean Catch Updated:  07/22/20 0205    UA w Reflex to Microscopic w Reflex to Culture [[id number]]  (Abnormal) Collected:  07/22/20 0147    Lab Status:  Final result Specimen:  Urine, Clean Catch Updated:  07/22/20 0202     Color, UA Red     Clarity, UA Cloudy     Specific Gravity, UA >=1 030     pH, UA 6 5     Leukocytes, UA Trace     Nitrite, UA Positive     Protein, UA >=300 mg/dl      Glucose, UA Negative mg/dl      Ketones, UA Trace mg/dl      Urobilinogen, UA 1 0 E U /dl      Bilirubin, UA Moderate     Blood, UA Large    Protime-INR [[id number]]  (Normal) Collected:  07/22/20 0138    Lab Status:  Final result Specimen:  Blood from Arm, Right Updated:  07/22/20 0200     Protime 12 9 seconds      INR 0 97    APTT [[id number]]  (Normal) Collected:  07/22/20 0138    Lab Status:  Final result Specimen:  Blood from Arm, Right Updated:  07/22/20 0200     PTT 28 seconds     CBC and differential [[id number]]  (Abnormal) Collected:  07/22/20 0138    Lab Status:  Final result Specimen:  Blood from Arm, Right Updated:  07/22/20 0149     WBC 6 48 Thousand/uL      RBC 5 07 Million/uL      Hemoglobin 13 4 g/dL      Hematocrit 41 1 %      MCV 81 fL      MCH 26 4 pg      MCHC 32 6 g/dL      RDW 13 3 %      MPV 9 4 fL      Platelets 087 Thousands/uL      nRBC 0 /100 WBCs      Neutrophils Relative 54 %      Immat GRANS % 1 %      Lymphocytes Relative 26 %      Monocytes Relative 14 %      Eosinophils Relative 4 %      Basophils Relative 1 %      Neutrophils Absolute 3 56 Thousands/µL      Immature Grans Absolute 0 07 Thousand/uL      Lymphocytes Absolute 1 67 Thousands/µL      Monocytes Absolute 0 88 Thousand/µL      Eosinophils Absolute 0 25 Thousand/µL      Basophils Absolute 0 05 Thousands/µL                  CT renal stone study abdomen pelvis without contrast   Final Result by Alex Escamilla MD (07/22 0216)      No acute intra-abdominal abnormality  Left-sided ureteral stent in appropriate position  No hydronephrosis or hydroureter  3 mm nonobstructing right renal midpole calculus  Enlarged fatty liver               Workstation performed: [id number]                           ED Course  ED Course as of Jul 22 0300 Wed Jul 22, 2020   0253 Patient discussed with Urology, ONEIDA Wan, familiar with patient, given normal lab and CT findings, recommends patient be discharged home with Pyridium and Keflex, call 1st thing in the morning to schedule outpatient stent removal      0300 Lab and imaging findings discussed with patient at bedside which are consistent with urinary tract infection, CT scan shows appropriate stent placement, in accordance with recommendations from Urology, patient will be discharged with antibiotics and Pyridium as well as instructions for follow-up, advised to return if symptoms worsen, patient acknowledges understanding and agreement with this plan          US AUDIT      Most Recent Value   Initial Alcohol Screen: US AUDIT-C    1  How often do you have a drink containing alcohol?  0 Filed at: 07/22/2020 0131   Audit-C Score  0 Filed at: 07/22/2020 0131                  REBEKAH/DAST-10      Most Recent Value   How many times in the past year have you    Used an illegal drug or used a prescription medication for non-medical reasons? Never Filed at: 07/22/2020 0131                                    Disposition  Final diagnoses:   Gross hematuria   Urinary tract infection     Time reflects when diagnosis was documented in both MDM as applicable and the Disposition within this note     Time User Action Codes Description Comment    7/22/2020  2:57 AM Sivan Rogers Add [R31 0] Gross hematuria     7/22/2020  2:58 AM Sivan Rogers Add [N39 0] Urinary tract infection       ED Disposition     ED Disposition Condition Date/Time Comment    Discharge Stable Wed Jul 22, 2020  2:57 AM Roderick Shepherd discharge to home/self care              Follow-up Information     Follow up With Specialties Details Why Contact Info    Leisa Severs, MD Urology   [address] [facility]  [address] [facility]  [phone] Patient's Medications   Discharge Prescriptions    CEPHALEXIN (KEFLEX) 500 MG CAPSULE    Take 1 capsule (500 mg total) by mouth every 12 (twelve) hours for 10 days       Start Date: 7/22/2020 End Date: 8/1/2020       Order Dose: 500 mg       Quantity: 20 capsule    Refills: 0    PHENAZOPYRIDINE (PYRIDIUM) 200 MG TABLET    Take 1 tablet (200 mg total) by mouth 3 (three) times a day       Start Date: 7/22/2020 End Date: --       Order Dose: 200 mg       Quantity: 20 tablet    Refills: 0     No discharge procedures on file      PDMP Review     None          ED Provider  Electronically Signed by           Lucina Heck DO  07/22/20 3203

## 2020-07-23 ENCOUNTER — TELEPHONE (OUTPATIENT)
Dept: UROLOGY | Facility: MEDICAL CENTER | Age: 50
End: 2020-07-23

## 2020-07-23 LAB — BACTERIA UR CULT: NORMAL

## 2020-07-23 NOTE — TELEPHONE ENCOUNTER
Called pt to confirm appt with Dr Georgie Mcdaniels tomorrow morning at 9:30 am for cysto/stent removal  Dr Georgie Mcdaniels aware

## 2020-07-23 NOTE — TELEPHONE ENCOUNTER
I do not see a stone on his CT scan  The stent is in good position  His stones initially were very small and have likely passed    I would arrange for stent removal with the 1st available provider (cysto)

## 2020-07-23 NOTE — TELEPHONE ENCOUNTER
He does not need a nurse visit    Please to schedule him for a stent removal with the 1st available provider who can do cysto/stent removal

## 2020-07-24 ENCOUNTER — PROCEDURE VISIT (OUTPATIENT)
Dept: UROLOGY | Facility: MEDICAL CENTER | Age: 50
End: 2020-07-24
Payer: COMMERCIAL

## 2020-07-24 VITALS
HEIGHT: 71 IN | WEIGHT: 315 LBS | DIASTOLIC BLOOD PRESSURE: 82 MMHG | TEMPERATURE: 97.7 F | BODY MASS INDEX: 44.1 KG/M2 | SYSTOLIC BLOOD PRESSURE: 148 MMHG

## 2020-07-24 DIAGNOSIS — N20.1 CALCULUS OF URETER: Primary | ICD-10-CM

## 2020-07-24 PROCEDURE — 52310 CYSTOSCOPY AND TREATMENT: CPT | Performed by: UROLOGY

## 2020-07-24 PROCEDURE — 99213 OFFICE O/P EST LOW 20 MIN: CPT | Performed by: UROLOGY

## 2020-07-24 NOTE — LETTER
July 24, 2020     [facility]  [address] [address] [address]    Patient: Diego Persaud   YOB: 1970   Date of Visit: 7/24/2020       Dear Dr Wilma Bettencourt:    Thank you for referring Leon Andino to me for evaluation  Below are my notes for this consultation  If you have questions, please do not hesitate to call me  I look forward to following your patient along with you  Sincerely,        Joan Carranza MD        CC: No Recipients  Joan Carranza MD  7/24/2020  9:57 AM  Sign at close encounter     HISTORY:    Follow-up on stone treatment of the past two weeks  Admitted with two distal left ureteral stones, mild hydro, mild renal insufficiency  Underwent ureteroscopy, difficult procedure, unable to gets scope past the UVJ, stent was placed  Repeat CT showed stones gone, likely they passed at some point  Having some penile irritation from the stent  ASSESSMENT / PLAN:      Stent removed today without difficulty  This is his first episode of stones  Oxalate advice given  No other stones present on the CT, no further evaluation or treatment needed    He knows to  return if any new stone issues  The following portions of the patient's history were reviewed and updated as appropriate: allergies, current medications, past family history, past medical history, past social history, past surgical history and problem list     Review of Systems   All other systems reviewed and are negative  Objective:     Physical Exam   Genitourinary:   Genitourinary Comments:   Penis testes normal except for penile retraction         Cystoscopy  Date/Time: 7/24/2020 9:56 AM  Performed by: Joan Carranza MD  Authorized by: Joan Carranza MD     Procedure details: simple removal of a foreign body, stone, or stent    Additional Procedure Details:      Patient presents for cystoscopy    I have discussed the reasons for doing the test, and the potential risks and complications  Patient expressed understanding, and signed informed consent document  The patient was carefully  positioned supine on the examining table  Sterile preparation was performed on the urethra  Xylocaine jelly was instilled and left  Indwelling for the procedure  The 13 [place] flexible cystoscope was passed with the following findings:      Urethra:  Scope was passed, string is seen in distal urethra, and stent was removed  Patient tolerated the procedure well and was escorted from the examining table  No results found for: PSA]  BUN   Date Value Ref Range Status   07/22/2020 11 5 - 25 mg/dL Final     Creatinine   Date Value Ref Range Status   07/22/2020 1 18 0 60 - 1 30 mg/dL Final     Comment:     Standardized to IDMS reference method     No components found for: CBC      Patient Active Problem List   Diagnosis    Nephrolithiasis    Transaminitis    Hypertensive urgency    [facility] [address] [facility]    Opioid dependence in remission ([facility] [address] )        There are no diagnoses linked to this encounter  Patient ID: Daniel Gibson is a 51 y o  male        Current Outpatient Medications:     buprenorphine-naloxone (SUBOXONE) 8-2 mg per SL tablet, Place 1 tablet under the tongue daily, Disp: , Rfl:     cephalexin (KEFLEX) 500 mg capsule, Take 1 capsule (500 mg total) by mouth every 12 (twelve) hours for 10 days, Disp: 20 capsule, Rfl: 0    oxybutynin (DITROPAN) 5 mg tablet, Take 1 tablet (5 mg total) by mouth 3 (three) times a day, Disp: 30 tablet, Rfl: 1    pantoprazole (PROTONIX) 40 mg tablet, Take 40 mg by mouth daily, Disp: , Rfl:     phenazopyridine (PYRIDIUM) 200 mg tablet, Take 1 tablet (200 mg total) by mouth 3 (three) times a day, Disp: 20 tablet, Rfl: 0    tamsulosin (FLOMAX) 0 4 mg, Take 1 capsule (0 4 mg total) by mouth daily with dinner, Disp: 30 capsule, Rfl: 0    Past Medical History:   Diagnosis Date    Back pain     Chronic GERD        Past Surgical History:   Procedure Laterality Date    FL RETROGRADE PYELOGRAM  7/12/2020    [place] CYSTO/URETERO W/LITHOTRIPSY &INDWELL STENT INSRT Left 7/12/2020    Procedure: CYSTOSCOPY URETEROSCOPY , RETROGRADE PYELOGRAM AND INSERTION STENT URETERAL;  Surgeon: Jenniffer Laws MD;  [facility];  Service: Urology       Social History

## 2020-07-24 NOTE — PATIENT INSTRUCTIONS
DRINK 3 QUARTS (96 Oz ) LIQUIDS EACH DAY - ALL LIQUIDS COUNT       ** THESE FOODS ARE HIGH IN OXALATE - TRY TO LIMIT HOW MUCH OF THESE YOU EAT:  Coke and Pepsi  Nuts  Dark Leafy Greens:     Spinach and Kale, Rhubarb, Chard  Asparagus  Beets  Sweet potatoes   Blueberries, Strawberries   Dark tea (Green tea is okay)  Tofu    ADD LEMON JUICE 3 OZ  DAILY - Fresh squeezed or lemon juice concentrate - Not MinuteMaid, [place]  [place] ([facility]) Hill, Crystal Lite, etc         ** Recipe for TU'S OLDE TYME LEMONADE:         One ounce of lemon juice, glass of water, sweetener of your choice    Coffee is okay! Cranberry juice is good to prevent infections, but does not help for stones

## 2020-07-24 NOTE — PROGRESS NOTES
HISTORY:    Follow-up on stone treatment of the past two weeks  Admitted with two distal left ureteral stones, mild hydro, mild renal insufficiency  Underwent ureteroscopy, difficult procedure, unable to gets scope past the UVJ, stent was placed  Repeat CT showed stones gone, likely they passed at some point  Having some penile irritation from the stent  ASSESSMENT / PLAN:      Stent removed today without difficulty  This is his first episode of stones  Oxalate advice given  No other stones present on the CT, no further evaluation or treatment needed    He knows to  return if any new stone issues  The following portions of the patient's history were reviewed and updated as appropriate: allergies, current medications, past family history, past medical history, past social history, past surgical history and problem list     Review of Systems   All other systems reviewed and are negative  Objective:     Physical Exam   Genitourinary:   Genitourinary Comments:   Penis testes normal except for penile retraction         Cystoscopy  Date/Time: 7/24/2020 9:56 AM  Performed by: Fred Villatoro MD  Authorized by: Fred Villatoro MD     Procedure details: simple removal of a foreign body, stone, or stent    Additional Procedure Details:      Patient presents for cystoscopy  I have discussed the reasons for doing the test, and the potential risks and complications  Patient expressed understanding, and signed informed consent document  The patient was carefully  positioned supine on the examining table  Sterile preparation was performed on the urethra  Xylocaine jelly was instilled and left  Indwelling for the procedure  The 13 [employer]azight flexible cystoscope was passed with the following findings:      Urethra:  Scope was passed, string is seen in distal urethra, and stent was removed  Patient tolerated the procedure well and was escorted from the examining table        No results found for: PSA]  BUN   Date Value Ref Range Status   07/22/2020 11 5 - 25 mg/dL Final     Creatinine   Date Value Ref Range Status   07/22/2020 1 18 0 60 - 1 30 mg/dL Final     Comment:     Standardized to IDMS reference method     No components found for: CBC      Patient Active Problem List   Diagnosis    Nephrolithiasis    Transaminitis    Hypertensive urgency    [facility] ([facility] [address] )    Opioid dependence in remission ([facility] [address] )        There are no diagnoses linked to this encounter  Patient ID: Franko Pitts is a 51 y o  male        Current Outpatient Medications:     buprenorphine-naloxone (SUBOXONE) 8-2 mg per SL tablet, Place 1 tablet under the tongue daily, Disp: , Rfl:     cephalexin (KEFLEX) 500 mg capsule, Take 1 capsule (500 mg total) by mouth every 12 (twelve) hours for 10 days, Disp: 20 capsule, Rfl: 0    oxybutynin (DITROPAN) 5 mg tablet, Take 1 tablet (5 mg total) by mouth 3 (three) times a day, Disp: 30 tablet, Rfl: 1    pantoprazole (PROTONIX) 40 mg tablet, Take 40 mg by mouth daily, Disp: , Rfl:     phenazopyridine (PYRIDIUM) 200 mg tablet, Take 1 tablet (200 mg total) by mouth 3 (three) times a day, Disp: 20 tablet, Rfl: 0    tamsulosin (FLOMAX) 0 4 mg, Take 1 capsule (0 4 mg total) by mouth daily with dinner, Disp: 30 capsule, Rfl: 0    Past Medical History:   Diagnosis Date    Back pain     Chronic GERD        Past Surgical History:   Procedure Laterality Date    FL RETROGRADE PYELOGRAM  7/12/2020    [place] CYSTO/URETERO W/LITHOTRIPSY &INDWELL STENT INSRT Left 7/12/2020    Procedure: CYSTOSCOPY URETEROSCOPY , RETROGRADE PYELOGRAM AND INSERTION STENT URETERAL;  Surgeon: Tatyana Buitrago MD;  [facility];  Service: Urology       Social History

## 2020-07-28 NOTE — TELEPHONE ENCOUNTER
Patient called and said he received a call today to have his stent removed and it was actually taken care of last week  I do not see any notation that he was called  This is a FYI  He does not need a call back from the office

## 2020-07-28 NOTE — TELEPHONE ENCOUNTER
LMOM and relayed Dr Ramirez Buras message  Dr Jami Enriquez has an appt open on Thursday 7/30/20  Please call back to make an appt for a CYSTO, [address] with the next available doctor

## 2023-12-18 NOTE — TELEPHONE ENCOUNTER
Called pt to schedule stent removal with Nurse and pt stated he did not have a string  Surgery note from Dr Grupo Fletcher states "The string was cut short to aid in the stents later removal"  This pt is extremely upset due to having to call the office repeatedly and having people "lying to him" about things  Rashida Myrick verified ok to remove stent per CT scan on 7/22  Will call pt to set up appt but will ask Dr Sree Jackson first if he will need a cysto to remove stent since no string is visible 
Duplicate encounters for this patient  Please see chart for further encounters at this time 
Patient was seen by Dr De La Rosa Lobe in Er has a stent looking for stent appointment  Please advise 
Unable to attach to health call   Patient called in regard to stent removal   He had been in the ER yesterday and had the cat scan necessary for this information    Please call at [phone] until 3p  Thank you
medical device

## 2024-02-23 NOTE — ASSESSMENT & PLAN NOTE
The defib pads were placed on the patient's chest and back. · AST 60, ALT 83, phos 65  · Highly suspect somewhat chronic in nature due to imaging findings   · CT showed hepatomegaly with fatty infiltration  · Patient denies right upper pain

## 2024-02-28 NOTE — ASSESSMENT & PLAN NOTE
A few reminders from today:    Monitor blood pressure at home  Continue daily allegra  Continue daily flonase  Cough drops as needed  Honey lemon tea  Hot steamy showers/warm compresses over sinuses  Rest, hydrate, eat healthy  Tylenol as needed for discomfort  Take daily multivitamin, eat plenty of protein to help with strength and healing  Warm salt water gargles as needed for throat irritation  Tessalon as needed for cough  Augmentin as prescribed, take with food  Get labs ordered by Dr. Esteves today      Follow up with me if needed.   Please go to ER/urgent care if after hours or symptoms persist/worsen.     Do not hesitate to get in touch with me should you have any further questions.     Thank you for trusting me with your care!  I wish you health and happiness.    -Sujatha Cerda PA-C     · Trended down appropriately to 1 29 status post IV fluids  · Likely secondary to kidney stones/stricture  · Creatinine 1 39, in CareEverywhere last creatinine level was were 0 89  · Baseline difficult to assess not very many values to average  · Continue IV fluids  · Patient refuses opioids, for pain management started on Toradol every 6 hours PRN  · Refusing blood work on 07/13

## 2024-05-01 ENCOUNTER — PREP FOR PROCEDURE (OUTPATIENT)
Dept: PAIN MEDICINE | Facility: CLINIC | Age: 54
End: 2024-05-01

## 2024-05-01 ENCOUNTER — CONSULT (OUTPATIENT)
Dept: PAIN MEDICINE | Facility: CLINIC | Age: 54
End: 2024-05-01
Payer: COMMERCIAL

## 2024-05-01 VITALS
TEMPERATURE: 96.9 F | BODY MASS INDEX: 41.86 KG/M2 | SYSTOLIC BLOOD PRESSURE: 124 MMHG | HEART RATE: 80 BPM | OXYGEN SATURATION: 96 % | WEIGHT: 299 LBS | DIASTOLIC BLOOD PRESSURE: 78 MMHG | RESPIRATION RATE: 18 BRPM | HEIGHT: 71 IN

## 2024-05-01 DIAGNOSIS — M54.16 LUMBAR RADICULOPATHY: Primary | ICD-10-CM

## 2024-05-01 PROCEDURE — 99204 OFFICE O/P NEW MOD 45 MIN: CPT | Performed by: ANESTHESIOLOGY

## 2024-05-01 RX ORDER — LISINOPRIL AND HYDROCHLOROTHIAZIDE 20; 12.5 MG/1; MG/1
1 TABLET ORAL DAILY
COMMUNITY

## 2024-05-01 NOTE — PROGRESS NOTES
Assessment  1. Lumbar radiculopathy - Right    Right-sided lumbar radicular pain in the L5 dermatomal distribution accompanied by pain limited weakness numbness and paresthesias.  Patient has not yet participated with PT. Chronic pain with decreased participation with IADLs over the past few uears.  Has been taking OTC ibuprofen and tylenol in addition to gabapentin and Robaxin with modest benefit.  5/5 strength bilaterally, positive SLR left-sided. Reflexes 2+.  Additionally there is positive facet loading, left greater than right. Denies any gait instability, saddle anesthesia. On imaging moderate to severe degenerative disc disease with spondyloarthropathy in facet joints most prominently seen at L5-S1; multifactorial pathology contributing to severe right L5-S1 transforaminal stenosis.  Risks, benefits alternatives epidural steroid injections thoroughly discussed with patient.  Handouts provided questions answered to patient's satisfaction.  Lifestyle modifications extensively discussed including diet, exercise and weight loss in addition to core strengthening.  Will proceed with multimodal pain therapy plan as noted below:    Plan  -L5-S1 LESI; f/u 2 weeks post procedure  -lyrica 75mg t.i.d. previously ordered for patient; has since tapered counseled regarding sedative effects of taking this medication and provided up titration calendar.  Counseled not to take medication while driving or operating heavy machinery/using stairs  -script provided for formal physical therapy for right-sided lumbar radiculopathy; Physician directed home exercise plan as per [facility] demonstrated and handouts provided that patient plans to participate with for 1 hour, twice a week for the next 6 weeks.     There are risks associated with opioid medications, including dependence, addiction and tolerance. The patient understands and agrees to use these medications only as prescribed. Potential side effects of the medications include,  but are not limited to, constipation, drowsiness, addiction, impaired judgment and risk of fatal overdose if not taken as prescribed. The patient was warned against driving while taking sedation medications or operating heavy machinery. The patient voiced understanding. Sharing medications is a felony. At this point in time, the patient is showing no signs of addiction, abuse, diversion or suicidal ideation.     [place] Prescription Drug Monitoring Program report was reviewed and was appropriate      Complete risks and benefits including bleeding, infection, tissue reaction, nerve injury and allergic reaction were discussed. The approach was demonstrated using models and literature was provided. Verbal and written consent was obtained.     My impressions and treatment recommendations were discussed in detail with the patient who verbalized understanding and had no further questions.  Discharge instructions were provided. I personally saw and examined the patient and I agree with the above discussed plan of care.      New Medications Ordered This Visit   Medications    lisinopril-hydrochlorothiazide (PRINZIDE,ZESTORETIC) 20-12.5 MG per tablet     Sig: Take 1 tablet by mouth daily       History of Present Illness    Lee Shepherd is a 53 y.o. male with pmhx of obesity, GERD, HTN, kidney stones presenting with chronic right sided lumbar radicular pain in the L5 and S1 dermatomal distributions. Debilitating pain limited weakness numbness and paresthesias accompany the pain. The patient rates the pain at a 8/10 accompanied by electric shock-like shooting features and crampy burning pain in the aforementioned dermatomal distributions.  The pain is worse in the mornings as well as the end of the day; exertion such as walking for long periods of time seems to exacerbate the pain.  The patient can hardly walk more than a few blocks without having debilitating pain.  He tries to maintain an active lifestyle and finds  that the current degree of pain seems to compromises his efforts.  The pain significantly impacts independent activities of daily living and contributes to significant disability.  He not yet attempted formal PT in recent years.  He has taken nsaids, tylenol as well as lyrica and muscle relaxers with limited relief of the pain as well.  He has  tried epidural steroid injections in the past with varying degrees of efficacy. He denies any bowel or bladder dysfunction/incontinence, saddle anesthesia or gait instability.    I have personally reviewed and/or updated the patient's past medical history, past surgical history, family history, social history, current medications, allergies, and vital signs today.     Review of Systems   Constitutional:  Positive for activity change.   HENT: Negative.     Eyes: Negative.    Respiratory: Negative.     Cardiovascular: Negative.    Gastrointestinal: Negative.    Endocrine: Negative.    Genitourinary: Negative.    Musculoskeletal:  Positive for arthralgias, back pain, gait problem and myalgias.   Skin: Negative.    Allergic/Immunologic: Negative.    Neurological:  Positive for weakness and numbness.   Hematological: Negative.    Psychiatric/Behavioral: Negative.     All other systems reviewed and are negative.      Patient Active Problem List   Diagnosis    Nephrolithiasis    Transaminitis    Hypertensive urgency    Acute kidney injury (HCC)    Opioid dependence in remission (HCC)    Calculus of ureter       Past Medical History:   Diagnosis Date    Back pain     Chronic GERD        Past Surgical History:   Procedure Laterality Date    FL RETROGRADE PYELOGRAM  7/12/2020    DE CYSTO/URETERO W/LITHOTRIPSY &INDWELL STENT INSRT Left 7/12/2020    Procedure: CYSTOSCOPY URETEROSCOPY , RETROGRADE PYELOGRAM AND INSERTION STENT URETERAL;  Surgeon: Mario Rodriges MD;  Location: [facility];  Service: Urology       History reviewed. No pertinent family history.    Social History  "    Occupational History    Not on file   Tobacco Use    Smoking status: Unknown    Smokeless tobacco: Never   Vaping Use    Vaping status: Never Used   Substance and Sexual Activity    Alcohol use: Never    Drug use: Never    Sexual activity: Not Currently       Current Outpatient Medications on File Prior to Visit   Medication Sig    buprenorphine-naloxone (SUBOXONE) 8-2 mg per SL tablet Place 1 tablet under the tongue daily    carboxymethylcellulose (Refresh Plus) 0.5 % SOLN INSTILL 1 DROP IN EACH EYE EVERY 2 HOURS WHILE AWAKE AS NEEDED FOR DRY EYES    Cholecalciferol 125 MCG (5000 UT) capsule Take 1 capsule by mouth daily    ibuprofen (MOTRIN) 600 mg tablet 600 mg    lisinopril-hydrochlorothiazide (PRINZIDE,ZESTORETIC) 20-12.5 MG per tablet Take 1 tablet by mouth daily    methocarbamol (ROBAXIN) 500 mg tablet 500 mg    oxybutynin (DITROPAN) 5 mg tablet Take 1 tablet (5 mg total) by mouth 3 (three) times a day (Patient not taking: Reported on 5/1/2024)    pantoprazole (PROTONIX) 40 mg tablet Take 40 mg by mouth daily (Patient not taking: Reported on 5/1/2024)    phenazopyridine (PYRIDIUM) 200 mg tablet Take 1 tablet (200 mg total) by mouth 3 (three) times a day (Patient not taking: Reported on 5/1/2024)    pregabalin (LYRICA) 75 mg capsule 75 mg (Patient not taking: Reported on 5/1/2024)     No current facility-administered medications on file prior to visit.       Allergies   Allergen Reactions    Cyclobenzaprine Other (See Comments) and Shortness Of Breath    Carbamates Other (See Comments)    Other          Physical Exam    /78 (BP Location: Left arm, Patient Position: Sitting, Cuff Size: Large)   Pulse 80   Temp (!) 96.9 °F (36.1 °C)   Resp 18   Ht 5' 11\" (1.803 m)   Wt 136 kg (299 lb)   SpO2 96%   BMI 41.70 kg/m²     Constitutional: normal, well developed, well nourished, alert, in no distress and non-toxic and no overt pain behavior. and obese  Eyes: anicteric  HEENT: grossly intact  Neck: " supple, symmetric, trachea midline and no masses   Pulmonary:even and unlabored  Cardiovascular:No edema or pitting edema present  Skin:Normal without rashes or lesions and well hydrated  Psychiatric:Mood and affect appropriate  Neurologic:Cranial Nerves II-XII grossly intact Sensation grossly intact; no clonus negative hubbard's. Reflexes 2+ and brisk. SLR negative bilaterally. Spurling's maneuver negative bilaterally.  Musculoskeletal:normal gait. 5/5 strength bilaterally with AROM in lower extremities. Difficulty with normal heel toe and tip toe walking. Significant pain with lumbar facet loading bilaterally and with lateral spine rotation. Significant ttp over lumbar paraspinal muscles. Negative jackelin's test, negative gaenslen's negative SIJ loading bilaterally.    Imaging    MRI lumbar spine wo contrast  Order: [id number]  Narrative    MRI LUMBAR SPINE WO CONTRAST    IMPRESSION:    Multilevel degenerative lumbar spondylosis, especially involving the L3-L4 through L5-S1 levels.    END OF IMPRESSION:    INDICATION: Radiculopathy, lumbar region.    TECHNIQUE: MR imaging of the lumbar spine was performed without contrast.    COMPARISON: None available.    FINDINGS:    For the purposes of nomenclature, the last fully formed disc space is considered L5-S1 (series 7, image 28).    Curvature and Alignment: Preserved lumbar lordosis without significant spondylolisthesis.    Vertebral Body Shape and Signal: Scattered small Schmorl's nodes throughout the visualized lower thoracic and lumbar spine. Multilevel disc degeneration throughout the lumbar spine especially involving the L5-S1 level. Degenerative endplate changes at L5-S1. Otherwise, bone marrow signal is maintained.    Conus Medullaris: Visualized lower spinal cord is unremarkable in signal and morphology. Conus medullaris terminates at the L1 level.    T12-L1: Posterior disc contour is maintained. No central canal or neural foraminal stenosis.    L1-L2: Tiny  posterior endplate ridging. Mild disc bulge. Mild central canal stenosis. Minimal bilateral neural foraminal stenosis.    L2-L3: Tiny posterior endplate ridging. Mild disc bulge. Mild central canal stenosis. Mild bilateral neural foraminal stenosis.    L3-L4: Minimal disc bulge. Mild bilateral facet arthrosis. Ligamentum flavum hypertrophy. No central canal stenosis. Mild to moderate bilateral neural foraminal stenosis.    L4-L5: Tiny posterior endplate ridging. Mild to moderate disc bulge. No central canal stenosis. Mild bilateral facet arthrosis. Ligamentum flavum hypertrophy. Moderate bilateral neural foraminal stenosis.    L5-S1: Moderate loss of disc height particularly along the right with endplate ridging. No central canal stenosis. Right subarticular recess stenosis. Severe right neural foraminal stenosis. Mild to moderate left neural foraminal stenosis. Mild bilateral facet arthrosis.

## 2024-05-23 ENCOUNTER — TELEPHONE (OUTPATIENT)
Age: 54
End: 2024-05-23

## 2024-05-23 NOTE — TELEPHONE ENCOUNTER
Spoke to patient and he stated he is going to be having surgery so he is not rescheduling at this time.

## 2024-05-23 NOTE — TELEPHONE ENCOUNTER
Caller: patient    Doctor: augusto    Reason for call: would like to cancel procedure. Patient will be getting surgery    Call back#:

## 2025-04-07 ENCOUNTER — HOSPITAL ENCOUNTER (EMERGENCY)
Facility: HOSPITAL | Age: 55
Discharge: HOME | End: 2025-04-07
Attending: EMERGENCY MEDICINE
Payer: COMMERCIAL

## 2025-04-07 ENCOUNTER — APPOINTMENT (EMERGENCY)
Dept: ULTRASOUND IMAGING | Facility: HOSPITAL | Age: 55
End: 2025-04-07
Payer: COMMERCIAL

## 2025-04-07 VITALS
SYSTOLIC BLOOD PRESSURE: 129 MMHG | RESPIRATION RATE: 18 BRPM | HEART RATE: 100 BPM | OXYGEN SATURATION: 95 % | BODY MASS INDEX: 39.66 KG/M2 | WEIGHT: 284.39 LBS | TEMPERATURE: 98.5 F | DIASTOLIC BLOOD PRESSURE: 67 MMHG

## 2025-04-07 DIAGNOSIS — R10.11 RIGHT UPPER QUADRANT ABDOMINAL PAIN: Primary | ICD-10-CM

## 2025-04-07 DIAGNOSIS — K80.20 GALLSTONES: ICD-10-CM

## 2025-04-07 LAB
ALBUMIN SERPL BCG-MCNC: 4.6 G/DL (ref 3.5–5)
ALP SERPL-CCNC: 177 U/L (ref 34–104)
ALT SERPL W P-5'-P-CCNC: 24 U/L (ref 7–52)
ANION GAP SERPL CALCULATED.3IONS-SCNC: 8 MMOL/L (ref 4–13)
AST SERPL W P-5'-P-CCNC: 19 U/L (ref 13–39)
BASOPHILS # BLD AUTO: 0.03 THOUSANDS/ÂΜL (ref 0–0.1)
BASOPHILS NFR BLD AUTO: 0 % (ref 0–1)
BILIRUB SERPL-MCNC: 0.58 MG/DL (ref 0.2–1)
BUN SERPL-MCNC: 15 MG/DL (ref 5–25)
CALCIUM SERPL-MCNC: 9.3 MG/DL (ref 8.4–10.2)
CARDIAC TROPONIN I PNL SERPL HS: 3 NG/L (ref ?–50)
CHLORIDE SERPL-SCNC: 99 MMOL/L (ref 96–108)
CO2 SERPL-SCNC: 29 MMOL/L (ref 21–32)
CREAT SERPL-MCNC: 0.6 MG/DL (ref 0.6–1.3)
EOSINOPHIL # BLD AUTO: 0.05 THOUSAND/ÂΜL (ref 0–0.61)
EOSINOPHIL NFR BLD AUTO: 1 % (ref 0–6)
ERYTHROCYTE [DISTWIDTH] IN BLOOD BY AUTOMATED COUNT: 14.3 % (ref 11.6–15.1)
GFR SERPL CREATININE-BSD FRML MDRD: 114 ML/MIN/1.73SQ M
GLUCOSE SERPL-MCNC: 165 MG/DL (ref 65–140)
HCT VFR BLD AUTO: 45.6 % (ref 36.5–49.3)
HGB BLD-MCNC: 14.5 G/DL (ref 12–17)
IMM GRANULOCYTES # BLD AUTO: 0.04 THOUSAND/UL (ref 0–0.2)
IMM GRANULOCYTES NFR BLD AUTO: 0 % (ref 0–2)
LACTATE SERPL-SCNC: 2.1 MMOL/L (ref 0.5–2)
LIPASE SERPL-CCNC: 25 U/L (ref 11–82)
LYMPHOCYTES # BLD AUTO: 0.69 THOUSANDS/ÂΜL (ref 0.6–4.47)
LYMPHOCYTES NFR BLD AUTO: 7 % (ref 14–44)
MCH RBC QN AUTO: 25 PG (ref 26.8–34.3)
MCHC RBC AUTO-ENTMCNC: 31.8 G/DL (ref 31.4–37.4)
MCV RBC AUTO: 79 FL (ref 82–98)
MONOCYTES # BLD AUTO: 0.65 THOUSAND/ÂΜL (ref 0.17–1.22)
MONOCYTES NFR BLD AUTO: 7 % (ref 4–12)
NEUTROPHILS # BLD AUTO: 8.42 THOUSANDS/ÂΜL (ref 1.85–7.62)
NEUTS SEG NFR BLD AUTO: 85 % (ref 43–75)
NRBC BLD AUTO-RTO: 0 /100 WBCS
PLATELET # BLD AUTO: 221 THOUSANDS/UL (ref 149–390)
PMV BLD AUTO: 9.7 FL (ref 8.9–12.7)
POTASSIUM SERPL-SCNC: 3.5 MMOL/L (ref 3.5–5.3)
PROT SERPL-MCNC: 8.1 G/DL (ref 6.4–8.4)
RBC # BLD AUTO: 5.8 MILLION/UL (ref 3.88–5.62)
SODIUM SERPL-SCNC: 136 MMOL/L (ref 135–147)
WBC # BLD AUTO: 9.88 THOUSAND/UL (ref 4.31–10.16)

## 2025-04-07 PROCEDURE — 96375 TX/PRO/DX INJ NEW DRUG ADDON: CPT

## 2025-04-07 PROCEDURE — 76705 ECHO EXAM OF ABDOMEN: CPT

## 2025-04-07 PROCEDURE — 96361 HYDRATE IV INFUSION ADD-ON: CPT

## 2025-04-07 PROCEDURE — 83690 ASSAY OF LIPASE: CPT | Performed by: EMERGENCY MEDICINE

## 2025-04-07 PROCEDURE — 36415 COLL VENOUS BLD VENIPUNCTURE: CPT | Performed by: EMERGENCY MEDICINE

## 2025-04-07 PROCEDURE — 83605 ASSAY OF LACTIC ACID: CPT | Performed by: EMERGENCY MEDICINE

## 2025-04-07 PROCEDURE — 96374 THER/PROPH/DIAG INJ IV PUSH: CPT

## 2025-04-07 PROCEDURE — 99284 EMERGENCY DEPT VISIT MOD MDM: CPT | Performed by: EMERGENCY MEDICINE

## 2025-04-07 PROCEDURE — 99284 EMERGENCY DEPT VISIT MOD MDM: CPT

## 2025-04-07 PROCEDURE — 85025 COMPLETE CBC W/AUTO DIFF WBC: CPT | Performed by: EMERGENCY MEDICINE

## 2025-04-07 PROCEDURE — 80053 COMPREHEN METABOLIC PANEL: CPT | Performed by: EMERGENCY MEDICINE

## 2025-04-07 PROCEDURE — 84484 ASSAY OF TROPONIN QUANT: CPT | Performed by: EMERGENCY MEDICINE

## 2025-04-07 RX ORDER — ONDANSETRON 4 MG/1
4 TABLET, FILM COATED ORAL EVERY 6 HOURS
Qty: 20 TABLET | Refills: 0 | Status: SHIPPED | OUTPATIENT
Start: 2025-04-07 | End: 2025-04-10

## 2025-04-07 RX ORDER — MORPHINE SULFATE 4 MG/ML
4 INJECTION, SOLUTION INTRAMUSCULAR; INTRAVENOUS ONCE
Status: DISCONTINUED | OUTPATIENT
Start: 2025-04-07 | End: 2025-04-07

## 2025-04-07 RX ORDER — ONDANSETRON 2 MG/ML
4 INJECTION INTRAMUSCULAR; INTRAVENOUS ONCE
Status: COMPLETED | OUTPATIENT
Start: 2025-04-07 | End: 2025-04-07

## 2025-04-07 RX ORDER — KETOROLAC TROMETHAMINE 30 MG/ML
15 INJECTION, SOLUTION INTRAMUSCULAR; INTRAVENOUS ONCE
Status: COMPLETED | OUTPATIENT
Start: 2025-04-07 | End: 2025-04-07

## 2025-04-07 RX ADMIN — KETOROLAC TROMETHAMINE 15 MG: 30 INJECTION, SOLUTION INTRAMUSCULAR; INTRAVENOUS at 15:28

## 2025-04-07 RX ADMIN — SODIUM CHLORIDE 1000 ML: 0.9 INJECTION, SOLUTION INTRAVENOUS at 15:25

## 2025-04-07 RX ADMIN — ONDANSETRON 4 MG: 2 INJECTION INTRAMUSCULAR; INTRAVENOUS at 15:24

## 2025-04-07 NOTE — ED PROVIDER NOTES
Time reflects when diagnosis was documented in both MDM as applicable and the Disposition within this note       Time User Action Codes Description Comment    4/7/2025  4:46 PM Marcelina Rogers [R10.11] Right upper quadrant abdominal pain     4/7/2025  4:46 PM Marcelina Rogers [K80.20] Gallstones           ED Disposition       ED Disposition   Discharge    Condition   Stable    Date/Time   Mon Apr 7, 2025  4:45 PM    Comment   Lee Shepherd discharge to home/self care.                   Assessment & Plan       Medical Decision Making  Abdominal exam without peritoneal signs.  No evidence of acute abdomen at this time.  Well-appearing.  Given work-up, low suspicion for acute   pancreatitis (negative lipase), PUD (including gastric perforation), acute infectious processes (pneumonia, hepatitis, pyelonephritis), acute appendicitis, vascular catastrophe, bowel obstruction, viscus perforation, ovarian/testicular torsion, or diverticulitis.  Presentation not consistent with other acute emergent causes of abdominal pain at this time.  Patient has noted to have multiple gallstones with findings otherwise equivocal for cholecystitis, no fever, no leukocytosis, surgery was consulted and evaluated patient, recommending outpatient follow-up which patient is in agreement with at this time, given strict return precautions    Problems Addressed:  Gallstones: acute illness or injury  Right upper quadrant abdominal pain: acute illness or injury    Amount and/or Complexity of Data Reviewed  Labs: ordered. Decision-making details documented in ED Course.  Radiology: ordered. Decision-making details documented in ED Course.    Risk  OTC drugs.  Prescription drug management.        ED Course as of 04/07/25 1837 Mon Apr 07, 2025   1836 Patient seen and evaluated by surgical team, recommend close outpatient follow-up   1837 HS Troponin 0hr (reflex protocol)   1837 Lipase   1837 Lactic acid, plasma (w/reflex if result > 2.0)(!)    1837 Comprehensive metabolic panel(!)   1837 CBC and differential(!)   1837 US right upper quadrant       Medications   sodium chloride 0.9 % bolus 1,000 mL (0 mL Intravenous Stopped 4/7/25 1640)   ondansetron (ZOFRAN) injection 4 mg (4 mg Intravenous Given 4/7/25 1524)   ketorolac (TORADOL) injection 15 mg (15 mg Intravenous Given 4/7/25 1528)       ED Risk Strat Scores                            SBIRT 20yo+      Flowsheet Row Most Recent Value   Initial Alcohol Screen: US AUDIT-C     1. How often do you have a drink containing alcohol? 0 Filed at: 04/07/2025 2622   2. How many drinks containing alcohol do you have on a typical day you are drinking?  0 Filed at: 04/07/2025 2743   3a. Male UNDER 65: How often do you have five or more drinks on one occasion? 0 Filed at: 04/07/2025 9606   3b. FEMALE Any Age, or MALE 65+: How often do you have 4 or more drinks on one occassion? 0 Filed at: 04/07/2025 1454   Audit-C Score 0 Filed at: 04/07/2025 1459   REBEKAH: How many times in the past year have you...    Used an illegal drug or used a prescription medication for non-medical reasons? Never Filed at: 04/07/2025 2901                            History of Present Illness       Chief Complaint   Patient presents with    Abdominal Pain     Patient reports abdominal pain since this AM along w/ nausea and 1 episode vomiting. Reports pain is sharp and located RUQ radiating to mid abdomen.        Past Medical History:   Diagnosis Date    Back pain     Chronic GERD       Past Surgical History:   Procedure Laterality Date    FL RETROGRADE PYELOGRAM  07/12/2020    IN CYSTO/URETERO W/LITHOTRIPSY &INDWELL STENT INSRT Left 07/12/2020    Procedure: CYSTOSCOPY URETEROSCOPY , RETROGRADE PYELOGRAM AND INSERTION STENT URETERAL;  Surgeon: Mario Rodriges MD;  Location: [facility];  Service: Urology    SPINAL FUSION        History reviewed. No pertinent family history.   Social History     Tobacco Use    Smoking status: Unknown    Smokeless  tobacco: Never   Vaping Use    Vaping status: Never Used   Substance Use Topics    Alcohol use: Never    Drug use: Never      E-Cigarette/Vaping    E-Cigarette Use Never User       E-Cigarette/Vaping Substances    Nicotine No     THC No     CBD No     Flavoring No       I have reviewed and agree with the history as documented.     Patient is a 54-year-old male presenting to the emergency department complaining of epigastric and right upper quadrant abdominal pain with nausea and 1 episode of vomiting, symptoms started upon awakening this morning, patient denies any diarrhea, had a normal bowel movement earlier this morning, this did not relieve symptoms at all, patient denies any dysuria or hematuria, no fever or chills, last ate something yesterday evening, no blood thinners, no previous abdominal surgeries        Review of Systems   Constitutional: Negative.    HENT: Negative.     Eyes: Negative.    Respiratory: Negative.     Cardiovascular: Negative.    Gastrointestinal:  Positive for abdominal pain, nausea and vomiting.   Endocrine: Negative.    Genitourinary: Negative.    Musculoskeletal: Negative.    Skin: Negative.    Allergic/Immunologic: Negative.    Neurological: Negative.    Hematological: Negative.    Psychiatric/Behavioral: Negative.             Objective       ED Triage Vitals   Temperature Pulse Blood Pressure Respirations SpO2 Patient Position - Orthostatic VS   04/07/25 1452 04/07/25 1452 04/07/25 1452 04/07/25 1452 04/07/25 1452 04/07/25 1452   98.5 °F (36.9 °C) 95 159/87 16 98 % Sitting      Temp Source Heart Rate Source BP Location FiO2 (%) Pain Score    04/07/25 1452 04/07/25 1530 04/07/25 1452 -- 04/07/25 1452    Temporal Monitor Right arm  9      Vitals      Date and Time Temp Pulse SpO2 Resp BP Pain Score FACES Pain Rating User   04/07/25 1649 -- 100 95 % 18 129/67 -- -- AC   04/07/25 1630 -- 102 94 % 18 127/68 -- -- AC   04/07/25 1600 -- 102 97 % 18 128/70 -- -- AC   04/07/25 1530 -- 90 98 %  18 138/78 -- --    04/07/25 1528 -- -- -- -- -- 8 --    04/07/25 1452 98.5 °F (36.9 °C) 95 98 % 16 159/87 9 -- NB            Physical Exam  Constitutional:       Appearance: He is well-developed. He is obese.   HENT:      Head: Normocephalic and atraumatic.   Eyes:      Pupils: Pupils are equal, round, and reactive to light.   Cardiovascular:      Rate and Rhythm: Normal rate and regular rhythm.   Pulmonary:      Effort: Pulmonary effort is normal.      Breath sounds: Normal breath sounds.   Abdominal:      General: Bowel sounds are normal.      Palpations: Abdomen is soft.      Tenderness: There is abdominal tenderness in the right upper quadrant and epigastric area.   Musculoskeletal:         General: Normal range of motion.      Cervical back: Normal range of motion and neck supple.   Skin:     General: Skin is warm.   Neurological:      Mental Status: He is alert.         Results Reviewed       Procedure Component Value Units Date/Time    HS Troponin 0hr (reflex protocol) [[id number]]  (Normal) Collected: 04/07/25 1503    Lab Status: Final result Specimen: Blood from Arm, Left Updated: 04/07/25 1538     hs TnI 0hr 3 ng/L     Comprehensive metabolic panel [[id number]]  (Abnormal) Collected: 04/07/25 1503    Lab Status: Final result Specimen: Blood from Arm, Left Updated: 04/07/25 1534     Sodium 136 mmol/L      Potassium 3.5 mmol/L      Chloride 99 mmol/L      CO2 29 mmol/L      ANION GAP 8 mmol/L      BUN 15 mg/dL      Creatinine 0.60 mg/dL      Glucose 165 mg/dL      Calcium 9.3 mg/dL      AST 19 U/L      ALT 24 U/L      Alkaline Phosphatase 177 U/L      Total Protein 8.1 g/dL      Albumin 4.6 g/dL      Total Bilirubin 0.58 mg/dL      eGFR 114 ml/min/1.73sq m     Narrative:      National Kidney Disease Foundation guidelines for Chronic Kidney Disease (CKD):     Stage 1 with normal or high GFR (GFR > 90 mL/min/1.73 square meters)    Stage 2 Mild CKD (GFR = 60-89 mL/min/1.73 square meters)    Stage 3A Moderate  CKD (GFR = 45-59 mL/min/1.73 square meters)    Stage 3B Moderate CKD (GFR = 30-44 mL/min/1.73 square meters)    Stage 4 Severe CKD (GFR = 15-29 mL/min/1.73 square meters)    Stage 5 End Stage CKD (GFR <15 mL/min/1.73 square meters)  Note: GFR calculation is accurate only with a steady state creatinine    Lipase [[id number]]  (Normal) Collected: 04/07/25 1503    Lab Status: Final result Specimen: Blood from Arm, Left Updated: 04/07/25 1534     Lipase 25 u/L     Lactic acid, plasma (w/reflex if result > 2.0) [[id number]]  (Abnormal) Collected: 04/07/25 1503    Lab Status: Final result Specimen: Blood from Arm, Left Updated: 04/07/25 1533     LACTIC ACID 2.1 mmol/L     Narrative:      Result may be elevated if tourniquet was used during collection.    CBC and differential [[id number]]  (Abnormal) Collected: 04/07/25 1503    Lab Status: Final result Specimen: Blood from Arm, Left Updated: 04/07/25 1515     WBC 9.88 Thousand/uL      RBC 5.80 Million/uL      Hemoglobin 14.5 g/dL      Hematocrit 45.6 %      MCV 79 fL      MCH 25.0 pg      MCHC 31.8 g/dL      RDW 14.3 %      MPV 9.7 fL      Platelets 221 Thousands/uL      nRBC 0 /100 WBCs      Segmented % 85 %      Immature Grans % 0 %      Lymphocytes % 7 %      Monocytes % 7 %      Eosinophils Relative 1 %      Basophils Relative 0 %      Absolute Neutrophils 8.42 Thousands/µL      Absolute Immature Grans 0.04 Thousand/uL      Absolute Lymphocytes 0.69 Thousands/µL      Absolute Monocytes 0.65 Thousand/µL      Eosinophils Absolute 0.05 Thousand/µL      Basophils Absolute 0.03 Thousands/µL             US right upper quadrant   Final Interpretation by Yasmani Resendiz DO (04/07 1607)      Distended gallbladder with stones and sludge as well as wall thickening but no ultrasonographic Bustillo's sign. Although the latter would mitigate against cholecystitis, other ultrasound findings are equivocal. If there is clinical concern for    cholecystitis, nuclear medicine HIDA scan  may be obtained.      Enlarged fatty liver.      Workstation performed: [id number]             Procedures    ED Medication and Procedure Management   Prior to Admission Medications   Prescriptions Last Dose Informant Patient Reported? Taking?   Cholecalciferol 125 MCG (5000 UT) capsule   Yes No   Sig: Take 1 capsule by mouth daily   buprenorphine-naloxone (SUBOXONE) 8-2 mg per SL tablet  Self Yes No   Sig: Place 1 tablet under the tongue daily   carboxymethylcellulose (Refresh Plus) 0.5 % SOLN   Yes No   Sig: INSTILL 1 DROP IN EACH EYE EVERY 2 HOURS WHILE AWAKE AS NEEDED FOR DRY EYES   ibuprofen (MOTRIN) 600 mg tablet   Yes No   Si mg   lisinopril-hydrochlorothiazide (PRINZIDE,ZESTORETIC) 20-12.5 MG per tablet   Yes No   Sig: Take 1 tablet by mouth daily   methocarbamol (ROBAXIN) 500 mg tablet   Yes No   Si mg      Facility-Administered Medications: None     Discharge Medication List as of 2025  4:48 PM        START taking these medications    Details   ondansetron (ZOFRAN) 4 mg tablet Take 1 tablet (4 mg total) by mouth every 6 (six) hours, Starting 2025, Normal           CONTINUE these medications which have NOT CHANGED    Details   buprenorphine-naloxone (SUBOXONE) 8-2 mg per SL tablet Place 1 tablet under the tongue daily, Historical Med      carboxymethylcellulose (Refresh Plus) 0.5 % SOLN INSTILL 1 DROP IN EACH EYE EVERY 2 HOURS WHILE AWAKE AS NEEDED FOR DRY EYES, Historical Med      Cholecalciferol 125 MCG (5000 UT) capsule Take 1 capsule by mouth daily, Starting Mon 10/23/2023, Historical Med      ibuprofen (MOTRIN) 600 mg tablet 600 mg, Starting 2024, Historical Med      lisinopril-hydrochlorothiazide (PRINZIDE,ZESTORETIC) 20-12.5 MG per tablet Take 1 tablet by mouth daily, Historical Med      methocarbamol (ROBAXIN) 500 mg tablet 500 mg, Starting 2024, Historical Med             ED SEPSIS DOCUMENTATION   Time reflects when diagnosis was documented in both MDM as  applicable and the Disposition within this note       Time User Action Codes Description Comment    4/7/2025  4:46 PM Marcelina Rogers [R10.11] Right upper quadrant abdominal pain     4/7/2025  4:46 PM Marcelina Rogers [K80.20] Gallstones                  Marcelina Rogers DO  04/07/25 1835

## 2025-04-07 NOTE — CONSULTS
Consultation - Surgery-General   Name: Lee Shepherd 54 y.o. male I MRN: [id number]  Unit/Bed#: ED 06 I Date of Admission: 4/7/2025   Date of Service: 4/7/2025 I Hospital Day: 0   Consults  Physician Requesting Evaluation: No att. providers found   Reason for Evaluation / Principal Problem: RUQ abdominal pain    Assessment & Plan  Symptomatic Cholecystitis  53yo M presenting to the ED with a 1 day h/o upper abd pain, nausea and 1 episode of vomiting. Lab studies WNL. RUQ US equivocal for acute cholecystitis.      RUQ US :  BILIARY:  The gallbladder is distended.  Gallbladder wall thickening up to 5 mm.  Sludge and small calculi present.  No sonographic Bustillo sign.  No intrahepatic biliary dilatation.  CBD measures 6.0 mm on average.  No choledocholithiasis.  IMPRESSION: Distended gallbladder with stones and sludge as well as wall thickening but no ultrasonographic Bustillo's sign. Although the latter would mitigate against cholecystitis, other ultrasound findings are equivocal. If there is clinical concern for cholecystitis, nuclear medicine HIDA scan may be obtained.    WBC 9  Hemoglobin 14.5  LFTs, electrolytes, lipase Wednesday WNL  LA 2.1  VSS and have no known    PMHx: GERD, T2DM on Ozempic (last dose 2 days ago), kidney stones  PSHx: Lumbar fusion, cystoscopies    Plan:  Follow-up in the outpatient surgery office for further evaluation and likely to be set up for elective cholecystectomy due to symptomatic cholecystitis.  He will ideally need to off of his Ozempic for 1 week prior to surgery.  It was discussed with the patient that he should return to the ED if symptoms return.         History of Present Illness   54-year-old male with past medical history significant for T2DM on Ozempic, obesity, GERD and kidney stones presenting to the ED with a 1 day history of midepigastric and RUQ abdominal pain.  He denies any history of similar pain in the past.  He had some associated nausea and 1 episode of  vomiting.  He denies any fever, chills, bowel or bladder habits, CP, SOB, dizziness, fatigue.  Pain is worse with palpation in the right upper quadrant.  He denies any radiation of the pain.  While in the ED, he was given Zofran and Toradol which relieved his nausea and most of his pain.  Lab studies showed no leukocytosis.  VS, LFTs, electrolytes and lipase were within normal limits.  RUQ US showed a distended gallbladder with stones, mild wall thickening and negative Bustillo sign.  No noted choledocholithiasis or pericholecystic     Review of Systems   Constitutional:  Negative for chills and fever.   HENT:  Negative for congestion, ear pain and sore throat.    Eyes:  Negative for pain and visual disturbance.   Respiratory:  Negative for cough, shortness of breath and wheezing.    Cardiovascular:  Negative for chest pain and palpitations.   Gastrointestinal:  Positive for abdominal pain. Negative for abdominal distention, constipation, diarrhea, nausea and vomiting.   Genitourinary:  Negative for dysuria and hematuria.   Musculoskeletal:  Negative for arthralgias and back pain.   Skin:  Negative for color change and rash.   Neurological:  Negative for dizziness, seizures, syncope and weakness.   Psychiatric/Behavioral:  Negative for agitation and confusion.    All other systems reviewed and are negative.    Historical Information   Past Medical History:   Diagnosis Date    Back pain     Chronic GERD      Past Surgical History:   Procedure Laterality Date    FL RETROGRADE PYELOGRAM  07/12/2020    AK CYSTO/URETERO W/LITHOTRIPSY &INDWELL STENT INSRT Left 07/12/2020    Procedure: CYSTOSCOPY URETEROSCOPY , RETROGRADE PYELOGRAM AND INSERTION STENT URETERAL;  Surgeon: Mario Rodriges MD;  Location: [facility];  Service: Urology    SPINAL FUSION       Social History     Tobacco Use    Smoking status: Unknown    Smokeless tobacco: Never   Vaping Use    Vaping status: Never Used   Substance and Sexual Activity    Alcohol  use: Never    Drug use: Never    Sexual activity: Not Currently     E-Cigarette/Vaping    E-Cigarette Use Never User      E-Cigarette/Vaping Substances    Nicotine No     THC No     CBD No     Flavoring No      History reviewed. No pertinent family history.    Objective :  Temp:  [98.5 °F (36.9 °C)] 98.5 °F (36.9 °C)  HR:  [] 100  BP: (127-159)/(67-87) 129/67  Resp:  [16-18] 18  SpO2:  [94 %-98 %] 95 %  O2 Device: None (Room air)      Physical Exam  Vitals and nursing note reviewed.   Constitutional:       General: He is not in acute distress.     Appearance: Normal appearance. He is well-developed. He is obese. He is not ill-appearing.   HENT:      Head: Normocephalic and atraumatic.      Nose: Nose normal.      Mouth/Throat:      Mouth: Mucous membranes are moist.   Eyes:      Conjunctiva/sclera: Conjunctivae normal.   Cardiovascular:      Rate and Rhythm: Normal rate and regular rhythm.      Heart sounds: Normal heart sounds. No murmur heard.  Pulmonary:      Effort: Pulmonary effort is normal. No respiratory distress.      Breath sounds: Normal breath sounds.   Abdominal:      General: Bowel sounds are normal. There is no distension.      Palpations: Abdomen is soft. There is no mass.      Tenderness: There is abdominal tenderness. There is no guarding or rebound.      Hernia: No hernia is present.      Comments: Tenderness to deep palpation in the midepigastric and right upper quadrant negative Bustillo sign.  No guarding.  Abdomen otherwise nontender.   Musculoskeletal:         General: No swelling.      Cervical back: Neck supple.   Skin:     General: Skin is warm and dry.      Capillary Refill: Capillary refill takes less than 2 seconds.      Findings: No rash.   Neurological:      General: No focal deficit present.      Mental Status: He is alert and oriented to person, place, and time.   Psychiatric:         Mood and Affect: Mood normal.         Behavior: Behavior normal.         Lab Results: I have  reviewed the following results:  Recent Labs     04/07/25  1503   WBC 9.88   HGB 14.5   HCT 45.6      SODIUM 136   K 3.5   CL 99   CO2 29   BUN 15   CREATININE 0.60   GLUC 165*   AST 19   ALT 24   ALB 4.6   TBILI 0.58   ALKPHOS 177*   HSTNI0 3   LACTICACID 2.1*             VTE Pharmacologic Prophylaxis: VTE covered by:    None     VTE Mechanical Prophylaxis: sequential compression device

## 2025-04-07 NOTE — ASSESSMENT & PLAN NOTE
53yo M presenting to the ED with a 1 day h/o upper abd pain, nausea and 1 episode of vomiting. Lab studies WNL. RUQ US equivocal for acute cholecystitis.      RUQ US :  BILIARY:  The gallbladder is distended.  Gallbladder wall thickening up to 5 mm.  Sludge and small calculi present.  No sonographic Bustillo sign.  No intrahepatic biliary dilatation.  CBD measures 6.0 mm on average.  No choledocholithiasis.  IMPRESSION: Distended gallbladder with stones and sludge as well as wall thickening but no ultrasonographic Bustillo's sign. Although the latter would mitigate against cholecystitis, other ultrasound findings are equivocal. If there is clinical concern for cholecystitis, nuclear medicine HIDA scan may be obtained.    WBC 9  Hemoglobin 14.5  LFTs, electrolytes, lipase Wednesday WNL  LA 2.1  VSS and have no known    PMHx: GERD, T2DM on Ozempic (last dose 2 days ago), kidney stones  PSHx: Lumbar fusion, cystoscopies    Plan:  Follow-up in the outpatient surgery office for further evaluation and likely to be set up for elective cholecystectomy due to symptomatic cholecystitis.  He will ideally need to off of his Ozempic for 1 week prior to surgery.  It was discussed with the patient that he should return to the ED if symptoms return.

## 2025-04-08 ENCOUNTER — ANESTHESIA (INPATIENT)
Dept: PERIOP | Facility: HOSPITAL | Age: 55
DRG: 418 | End: 2025-04-08
Payer: COMMERCIAL

## 2025-04-08 ENCOUNTER — APPOINTMENT (EMERGENCY)
Dept: CT IMAGING | Facility: HOSPITAL | Age: 55
DRG: 418 | End: 2025-04-08
Payer: COMMERCIAL

## 2025-04-08 ENCOUNTER — HOSPITAL ENCOUNTER (INPATIENT)
Facility: HOSPITAL | Age: 55
LOS: 2 days | Discharge: HOME | DRG: 418 | End: 2025-04-10
Attending: STUDENT IN AN ORGANIZED HEALTH CARE EDUCATION/TRAINING PROGRAM | Admitting: STUDENT IN AN ORGANIZED HEALTH CARE EDUCATION/TRAINING PROGRAM
Payer: COMMERCIAL

## 2025-04-08 ENCOUNTER — ANESTHESIA EVENT (INPATIENT)
Dept: PERIOP | Facility: HOSPITAL | Age: 55
DRG: 418 | End: 2025-04-08
Payer: COMMERCIAL

## 2025-04-08 ENCOUNTER — APPOINTMENT (EMERGENCY)
Dept: RADIOLOGY | Facility: HOSPITAL | Age: 55
DRG: 418 | End: 2025-04-08
Payer: COMMERCIAL

## 2025-04-08 DIAGNOSIS — K81.0 EMPHYSEMATOUS CHOLECYSTITIS: ICD-10-CM

## 2025-04-08 DIAGNOSIS — I10 HYPERTENSION, UNSPECIFIED TYPE: ICD-10-CM

## 2025-04-08 DIAGNOSIS — F11.90 CHRONIC, CONTINUOUS USE OF OPIOIDS: ICD-10-CM

## 2025-04-08 DIAGNOSIS — K81.0 ACUTE CHOLECYSTITIS: Primary | ICD-10-CM

## 2025-04-08 PROBLEM — I15.9 SECONDARY HYPERTENSION: Status: ACTIVE | Noted: 2025-04-08

## 2025-04-08 LAB
ALBUMIN SERPL BCG-MCNC: 4.2 G/DL (ref 3.5–5)
ALP SERPL-CCNC: 266 U/L (ref 34–104)
ALT SERPL W P-5'-P-CCNC: 131 U/L (ref 7–52)
ANION GAP SERPL CALCULATED.3IONS-SCNC: 9 MMOL/L (ref 4–13)
AST SERPL W P-5'-P-CCNC: 78 U/L (ref 13–39)
BACTERIA UR QL AUTO: ABNORMAL /HPF
BASOPHILS # BLD AUTO: 0.04 THOUSANDS/ÂΜL (ref 0–0.1)
BASOPHILS NFR BLD AUTO: 0 % (ref 0–1)
BILIRUB SERPL-MCNC: 4.27 MG/DL (ref 0.2–1)
BILIRUB UR QL STRIP: ABNORMAL
BUN SERPL-MCNC: 11 MG/DL (ref 5–25)
CALCIUM SERPL-MCNC: 9.1 MG/DL (ref 8.4–10.2)
CARDIAC TROPONIN I PNL SERPL HS: 6 NG/L (ref ?–50)
CHLORIDE SERPL-SCNC: 99 MMOL/L (ref 96–108)
CLARITY UR: CLEAR
CO2 SERPL-SCNC: 27 MMOL/L (ref 21–32)
COLOR UR: ABNORMAL
CREAT SERPL-MCNC: 0.61 MG/DL (ref 0.6–1.3)
EOSINOPHIL # BLD AUTO: 0.01 THOUSAND/ÂΜL (ref 0–0.61)
EOSINOPHIL NFR BLD AUTO: 0 % (ref 0–6)
ERYTHROCYTE [DISTWIDTH] IN BLOOD BY AUTOMATED COUNT: 14.7 % (ref 11.6–15.1)
GFR SERPL CREATININE-BSD FRML MDRD: 114 ML/MIN/1.73SQ M
GLUCOSE SERPL-MCNC: 155 MG/DL (ref 65–140)
GLUCOSE UR STRIP-MCNC: NEGATIVE MG/DL
HCT VFR BLD AUTO: 42.7 % (ref 36.5–49.3)
HGB BLD-MCNC: 13.7 G/DL (ref 12–17)
HGB UR QL STRIP.AUTO: ABNORMAL
IMM GRANULOCYTES # BLD AUTO: 0.05 THOUSAND/UL (ref 0–0.2)
IMM GRANULOCYTES NFR BLD AUTO: 0 % (ref 0–2)
KETONES UR STRIP-MCNC: ABNORMAL MG/DL
LACTATE SERPL-SCNC: 1.5 MMOL/L (ref 0.5–2)
LEUKOCYTE ESTERASE UR QL STRIP: NEGATIVE
LIPASE SERPL-CCNC: 12 U/L (ref 11–82)
LYMPHOCYTES # BLD AUTO: 0.79 THOUSANDS/ÂΜL (ref 0.6–4.47)
LYMPHOCYTES NFR BLD AUTO: 6 % (ref 14–44)
MCH RBC QN AUTO: 25.6 PG (ref 26.8–34.3)
MCHC RBC AUTO-ENTMCNC: 32.1 G/DL (ref 31.4–37.4)
MCV RBC AUTO: 80 FL (ref 82–98)
MONOCYTES # BLD AUTO: 1 THOUSAND/ÂΜL (ref 0.17–1.22)
MONOCYTES NFR BLD AUTO: 7 % (ref 4–12)
NEUTROPHILS # BLD AUTO: 12.41 THOUSANDS/ÂΜL (ref 1.85–7.62)
NEUTS SEG NFR BLD AUTO: 87 % (ref 43–75)
NITRITE UR QL STRIP: POSITIVE
NON-SQ EPI CELLS URNS QL MICRO: ABNORMAL /HPF
NRBC BLD AUTO-RTO: 0 /100 WBCS
PH UR STRIP.AUTO: 5.5 [PH]
PLATELET # BLD AUTO: 210 THOUSANDS/UL (ref 149–390)
PMV BLD AUTO: 9.4 FL (ref 8.9–12.7)
POTASSIUM SERPL-SCNC: 3.8 MMOL/L (ref 3.5–5.3)
PROT SERPL-MCNC: 7 G/DL (ref 6.4–8.4)
PROT UR STRIP-MCNC: ABNORMAL MG/DL
RBC # BLD AUTO: 5.36 MILLION/UL (ref 3.88–5.62)
RBC #/AREA URNS AUTO: ABNORMAL /HPF
SODIUM SERPL-SCNC: 135 MMOL/L (ref 135–147)
SP GR UR STRIP.AUTO: 1.01 (ref 1–1.03)
UROBILINOGEN UR QL STRIP.AUTO: 1 E.U./DL
WBC # BLD AUTO: 14.3 THOUSAND/UL (ref 4.31–10.16)
WBC #/AREA URNS AUTO: ABNORMAL /HPF

## 2025-04-08 PROCEDURE — 80053 COMPREHEN METABOLIC PANEL: CPT | Performed by: PHYSICIAN ASSISTANT

## 2025-04-08 PROCEDURE — 85025 COMPLETE CBC W/AUTO DIFF WBC: CPT | Performed by: PHYSICIAN ASSISTANT

## 2025-04-08 PROCEDURE — 96374 THER/PROPH/DIAG INJ IV PUSH: CPT

## 2025-04-08 PROCEDURE — 88304 TISSUE EXAM BY PATHOLOGIST: CPT | Performed by: STUDENT IN AN ORGANIZED HEALTH CARE EDUCATION/TRAINING PROGRAM

## 2025-04-08 PROCEDURE — 74177 CT ABD & PELVIS W/CONTRAST: CPT

## 2025-04-08 PROCEDURE — 36415 COLL VENOUS BLD VENIPUNCTURE: CPT | Performed by: PHYSICIAN ASSISTANT

## 2025-04-08 PROCEDURE — 0FT44ZZ RESECTION OF GALLBLADDER, PERCUTANEOUS ENDOSCOPIC APPROACH: ICD-10-PCS | Performed by: STUDENT IN AN ORGANIZED HEALTH CARE EDUCATION/TRAINING PROGRAM

## 2025-04-08 PROCEDURE — 83605 ASSAY OF LACTIC ACID: CPT | Performed by: PHYSICIAN ASSISTANT

## 2025-04-08 PROCEDURE — 8E0W4CZ ROBOTIC ASSISTED PROCEDURE OF TRUNK REGION, PERCUTANEOUS ENDOSCOPIC APPROACH: ICD-10-PCS | Performed by: STUDENT IN AN ORGANIZED HEALTH CARE EDUCATION/TRAINING PROGRAM

## 2025-04-08 PROCEDURE — 81001 URINALYSIS AUTO W/SCOPE: CPT | Performed by: PHYSICIAN ASSISTANT

## 2025-04-08 PROCEDURE — 71045 X-RAY EXAM CHEST 1 VIEW: CPT

## 2025-04-08 PROCEDURE — 99285 EMERGENCY DEPT VISIT HI MDM: CPT

## 2025-04-08 PROCEDURE — 83690 ASSAY OF LIPASE: CPT | Performed by: PHYSICIAN ASSISTANT

## 2025-04-08 PROCEDURE — 93005 ELECTROCARDIOGRAM TRACING: CPT

## 2025-04-08 PROCEDURE — 96375 TX/PRO/DX INJ NEW DRUG ADDON: CPT

## 2025-04-08 PROCEDURE — 96361 HYDRATE IV INFUSION ADD-ON: CPT

## 2025-04-08 PROCEDURE — 84484 ASSAY OF TROPONIN QUANT: CPT | Performed by: PHYSICIAN ASSISTANT

## 2025-04-08 RX ORDER — FENTANYL CITRATE/PF 50 MCG/ML
50 SYRINGE (ML) INJECTION
Status: DISCONTINUED | OUTPATIENT
Start: 2025-04-08 | End: 2025-04-09 | Stop reason: HOSPADM

## 2025-04-08 RX ORDER — HEPARIN SODIUM 5000 [USP'U]/ML
5000 INJECTION, SOLUTION INTRAVENOUS; SUBCUTANEOUS EVERY 8 HOURS SCHEDULED
Status: DISCONTINUED | OUTPATIENT
Start: 2025-04-09 | End: 2025-04-10 | Stop reason: HOSPADM

## 2025-04-08 RX ORDER — BUPIVACAINE HYDROCHLORIDE AND EPINEPHRINE 2.5; 5 MG/ML; UG/ML
INJECTION, SOLUTION EPIDURAL; INFILTRATION; INTRACAUDAL; PERINEURAL AS NEEDED
Status: DISCONTINUED | OUTPATIENT
Start: 2025-04-08 | End: 2025-04-08 | Stop reason: HOSPADM

## 2025-04-08 RX ORDER — LABETALOL HYDROCHLORIDE 5 MG/ML
INJECTION, SOLUTION INTRAVENOUS AS NEEDED
Status: DISCONTINUED | OUTPATIENT
Start: 2025-04-08 | End: 2025-04-08

## 2025-04-08 RX ORDER — PROPOFOL 10 MG/ML
INJECTION, EMULSION INTRAVENOUS AS NEEDED
Status: DISCONTINUED | OUTPATIENT
Start: 2025-04-08 | End: 2025-04-08

## 2025-04-08 RX ORDER — ONDANSETRON 2 MG/ML
4 INJECTION INTRAMUSCULAR; INTRAVENOUS ONCE AS NEEDED
Status: DISCONTINUED | OUTPATIENT
Start: 2025-04-08 | End: 2025-04-09 | Stop reason: HOSPADM

## 2025-04-08 RX ORDER — INDOCYANINE GREEN AND WATER 25 MG
5 KIT INJECTION ONCE
Status: COMPLETED | OUTPATIENT
Start: 2025-04-08 | End: 2025-04-08

## 2025-04-08 RX ORDER — FENTANYL CITRATE 50 UG/ML
INJECTION, SOLUTION INTRAMUSCULAR; INTRAVENOUS AS NEEDED
Status: DISCONTINUED | OUTPATIENT
Start: 2025-04-08 | End: 2025-04-08

## 2025-04-08 RX ORDER — ONDANSETRON 2 MG/ML
4 INJECTION INTRAMUSCULAR; INTRAVENOUS ONCE
Status: COMPLETED | OUTPATIENT
Start: 2025-04-08 | End: 2025-04-08

## 2025-04-08 RX ORDER — HYDROMORPHONE HCL IN WATER/PF 6 MG/30 ML
0.2 PATIENT CONTROLLED ANALGESIA SYRINGE INTRAVENOUS
Status: DISCONTINUED | OUTPATIENT
Start: 2025-04-08 | End: 2025-04-09 | Stop reason: HOSPADM

## 2025-04-08 RX ORDER — PROMETHAZINE HYDROCHLORIDE 25 MG/ML
12.5 INJECTION, SOLUTION INTRAMUSCULAR; INTRAVENOUS ONCE AS NEEDED
Status: DISCONTINUED | OUTPATIENT
Start: 2025-04-08 | End: 2025-04-09 | Stop reason: HOSPADM

## 2025-04-08 RX ORDER — SODIUM CHLORIDE, SODIUM LACTATE, POTASSIUM CHLORIDE, CALCIUM CHLORIDE 600; 310; 30; 20 MG/100ML; MG/100ML; MG/100ML; MG/100ML
INJECTION, SOLUTION INTRAVENOUS CONTINUOUS PRN
Status: DISCONTINUED | OUTPATIENT
Start: 2025-04-08 | End: 2025-04-08

## 2025-04-08 RX ORDER — ONDANSETRON 2 MG/ML
4 INJECTION INTRAMUSCULAR; INTRAVENOUS EVERY 6 HOURS PRN
Status: DISCONTINUED | OUTPATIENT
Start: 2025-04-08 | End: 2025-04-10 | Stop reason: HOSPADM

## 2025-04-08 RX ORDER — ACETAMINOPHEN 10 MG/ML
1000 INJECTION, SOLUTION INTRAVENOUS EVERY 6 HOURS PRN
Status: DISCONTINUED | OUTPATIENT
Start: 2025-04-08 | End: 2025-04-10 | Stop reason: HOSPADM

## 2025-04-08 RX ORDER — KETOROLAC TROMETHAMINE 30 MG/ML
15 INJECTION, SOLUTION INTRAMUSCULAR; INTRAVENOUS EVERY 6 HOURS PRN
Status: DISCONTINUED | OUTPATIENT
Start: 2025-04-08 | End: 2025-04-10 | Stop reason: HOSPADM

## 2025-04-08 RX ORDER — MAGNESIUM HYDROXIDE 1200 MG/15ML
LIQUID ORAL AS NEEDED
Status: DISCONTINUED | OUTPATIENT
Start: 2025-04-08 | End: 2025-04-08 | Stop reason: HOSPADM

## 2025-04-08 RX ORDER — ROCURONIUM BROMIDE 10 MG/ML
INJECTION, SOLUTION INTRAVENOUS AS NEEDED
Status: DISCONTINUED | OUTPATIENT
Start: 2025-04-08 | End: 2025-04-08

## 2025-04-08 RX ORDER — HYDROMORPHONE HCL/PF 1 MG/ML
0.5 SYRINGE (ML) INJECTION
Status: DISCONTINUED | OUTPATIENT
Start: 2025-04-08 | End: 2025-04-10 | Stop reason: HOSPADM

## 2025-04-08 RX ORDER — CEFTRIAXONE 1 G/50ML
1000 INJECTION, SOLUTION INTRAVENOUS EVERY 24 HOURS
Status: DISCONTINUED | OUTPATIENT
Start: 2025-04-09 | End: 2025-04-10 | Stop reason: HOSPADM

## 2025-04-08 RX ORDER — SODIUM CHLORIDE, SODIUM LACTATE, POTASSIUM CHLORIDE, CALCIUM CHLORIDE 600; 310; 30; 20 MG/100ML; MG/100ML; MG/100ML; MG/100ML
75 INJECTION, SOLUTION INTRAVENOUS CONTINUOUS
Status: DISCONTINUED | OUTPATIENT
Start: 2025-04-08 | End: 2025-04-09

## 2025-04-08 RX ORDER — SODIUM CHLORIDE 9 MG/ML
125 INJECTION, SOLUTION INTRAVENOUS CONTINUOUS
Status: DISCONTINUED | OUTPATIENT
Start: 2025-04-08 | End: 2025-04-10 | Stop reason: HOSPADM

## 2025-04-08 RX ORDER — LISINOPRIL 20 MG/1
20 TABLET ORAL DAILY
Status: DISCONTINUED | OUTPATIENT
Start: 2025-04-09 | End: 2025-04-10 | Stop reason: HOSPADM

## 2025-04-08 RX ORDER — MIDAZOLAM HYDROCHLORIDE 2 MG/2ML
INJECTION, SOLUTION INTRAMUSCULAR; INTRAVENOUS AS NEEDED
Status: DISCONTINUED | OUTPATIENT
Start: 2025-04-08 | End: 2025-04-08

## 2025-04-08 RX ORDER — DEXAMETHASONE SODIUM PHOSPHATE 10 MG/ML
INJECTION, SOLUTION INTRAMUSCULAR; INTRAVENOUS AS NEEDED
Status: DISCONTINUED | OUTPATIENT
Start: 2025-04-08 | End: 2025-04-08

## 2025-04-08 RX ORDER — LIDOCAINE HYDROCHLORIDE 10 MG/ML
INJECTION, SOLUTION EPIDURAL; INFILTRATION; INTRACAUDAL; PERINEURAL AS NEEDED
Status: DISCONTINUED | OUTPATIENT
Start: 2025-04-08 | End: 2025-04-08

## 2025-04-08 RX ORDER — CEFAZOLIN SODIUM 1 G/3ML
INJECTION, POWDER, FOR SOLUTION INTRAMUSCULAR; INTRAVENOUS AS NEEDED
Status: DISCONTINUED | OUTPATIENT
Start: 2025-04-08 | End: 2025-04-08

## 2025-04-08 RX ORDER — CEFAZOLIN SODIUM 2 G/50ML
2000 SOLUTION INTRAVENOUS
Status: DISCONTINUED | OUTPATIENT
Start: 2025-04-09 | End: 2025-04-08

## 2025-04-08 RX ORDER — BUPRENORPHINE AND NALOXONE 8; 2 MG/1; MG/1
8 FILM, SOLUBLE BUCCAL; SUBLINGUAL DAILY
Status: DISCONTINUED | OUTPATIENT
Start: 2025-04-09 | End: 2025-04-10 | Stop reason: HOSPADM

## 2025-04-08 RX ORDER — KETOROLAC TROMETHAMINE 30 MG/ML
15 INJECTION, SOLUTION INTRAMUSCULAR; INTRAVENOUS ONCE
Status: COMPLETED | OUTPATIENT
Start: 2025-04-08 | End: 2025-04-08

## 2025-04-08 RX ORDER — METRONIDAZOLE 500 MG/100ML
500 INJECTION, SOLUTION INTRAVENOUS EVERY 12 HOURS
Status: DISCONTINUED | OUTPATIENT
Start: 2025-04-08 | End: 2025-04-10 | Stop reason: HOSPADM

## 2025-04-08 RX ORDER — HYDROCHLOROTHIAZIDE 12.5 MG/1
12.5 TABLET ORAL DAILY
Status: DISCONTINUED | OUTPATIENT
Start: 2025-04-09 | End: 2025-04-10 | Stop reason: HOSPADM

## 2025-04-08 RX ORDER — HYDROMORPHONE HCL/PF 1 MG/ML
SYRINGE (ML) INJECTION AS NEEDED
Status: DISCONTINUED | OUTPATIENT
Start: 2025-04-08 | End: 2025-04-08

## 2025-04-08 RX ADMIN — ROCURONIUM BROMIDE 20 MG: 10 INJECTION, SOLUTION INTRAVENOUS at 21:40

## 2025-04-08 RX ADMIN — LABETALOL HYDROCHLORIDE 5 MG: 5 INJECTION, SOLUTION INTRAVENOUS at 22:32

## 2025-04-08 RX ADMIN — CEFAZOLIN 3000 MG: 1 INJECTION, POWDER, FOR SOLUTION INTRAMUSCULAR; INTRAVENOUS at 21:10

## 2025-04-08 RX ADMIN — HYDROMORPHONE HYDROCHLORIDE 0.5 MG: 1 INJECTION, SOLUTION INTRAMUSCULAR; INTRAVENOUS; SUBCUTANEOUS at 21:34

## 2025-04-08 RX ADMIN — ROCURONIUM BROMIDE 20 MG: 10 INJECTION, SOLUTION INTRAVENOUS at 22:30

## 2025-04-08 RX ADMIN — SODIUM CHLORIDE, SODIUM LACTATE, POTASSIUM CHLORIDE, AND CALCIUM CHLORIDE: .6; .31; .03; .02 INJECTION, SOLUTION INTRAVENOUS at 21:04

## 2025-04-08 RX ADMIN — PROPOFOL 300 MG: 10 INJECTION, EMULSION INTRAVENOUS at 21:09

## 2025-04-08 RX ADMIN — KETOROLAC TROMETHAMINE 15 MG: 30 INJECTION, SOLUTION INTRAMUSCULAR; INTRAVENOUS at 18:21

## 2025-04-08 RX ADMIN — SODIUM CHLORIDE, SODIUM LACTATE, POTASSIUM CHLORIDE, AND CALCIUM CHLORIDE: .6; .31; .03; .02 INJECTION, SOLUTION INTRAVENOUS at 22:31

## 2025-04-08 RX ADMIN — LIDOCAINE HYDROCHLORIDE 50 MG: 10 INJECTION, SOLUTION EPIDURAL; INFILTRATION; INTRACAUDAL; PERINEURAL at 21:09

## 2025-04-08 RX ADMIN — INDOCYANINE GREEN AND WATER 5 MG: KIT at 21:01

## 2025-04-08 RX ADMIN — ONDANSETRON 4 MG: 2 INJECTION INTRAMUSCULAR; INTRAVENOUS at 18:21

## 2025-04-08 RX ADMIN — LABETALOL HYDROCHLORIDE 5 MG: 5 INJECTION, SOLUTION INTRAVENOUS at 21:51

## 2025-04-08 RX ADMIN — MIDAZOLAM 2 MG: 1 INJECTION INTRAMUSCULAR; INTRAVENOUS at 21:02

## 2025-04-08 RX ADMIN — PIPERACILLIN AND TAZOBACTAM 4.5 G: 36; 4.5 INJECTION, POWDER, FOR SOLUTION INTRAVENOUS at 19:33

## 2025-04-08 RX ADMIN — ROCURONIUM BROMIDE 50 MG: 10 INJECTION, SOLUTION INTRAVENOUS at 21:09

## 2025-04-08 RX ADMIN — SUGAMMADEX 300 MG: 100 INJECTION, SOLUTION INTRAVENOUS at 23:12

## 2025-04-08 RX ADMIN — SODIUM CHLORIDE 1000 ML: 0.9 INJECTION, SOLUTION INTRAVENOUS at 18:19

## 2025-04-08 RX ADMIN — IOHEXOL 75 ML: 350 INJECTION, SOLUTION INTRAVENOUS at 19:04

## 2025-04-08 RX ADMIN — DEXAMETHASONE SODIUM PHOSPHATE 10 MG: 10 INJECTION, SOLUTION INTRAMUSCULAR; INTRAVENOUS at 21:09

## 2025-04-08 RX ADMIN — FENTANYL CITRATE 100 MCG: 0.05 INJECTION, SOLUTION INTRAMUSCULAR; INTRAVENOUS at 21:09

## 2025-04-08 NOTE — ED PROVIDER NOTES
"Time reflects when diagnosis was documented in both MDM as applicable and the Disposition within this note       Time User Action Codes Description Comment    4/8/2025  7:20 PM Amina Smith Add [K81.0] Acute cholecystitis     4/8/2025  7:46 PM Irlanda Thompson Add [K81.0] Emphysematous cholecystitis     4/8/2025  7:46 PM Irlanda Thompson Add [I10] Hypertension, unspecified type     4/8/2025  7:47 PM Irlanda Thompson Add [F11.90] Chronic, continuous use of opioids     4/8/2025 10:29 PM Natali Lin Modify [K81.0] Acute cholecystitis     4/8/2025 11:32 PM Billig, Katrina Modify [K81.0] Acute cholecystitis           ED Disposition       ED Disposition   Admit    Condition   Stable    Date/Time   Tue Apr 8, 2025  7:28 PM    Comment   Case was discussed with  john and the patient's admission status was agreed to be Admission Status: inpatient status to the service of Dr. Thompson  .               Assessment & Plan       Medical Decision Making  The patient is a 54-year-old male who presents for worsening abdominal pain.  The patient started having some nausea but took Zofran prior to arrival.  Patient states that last evening he began having worsening pain in his upper abdomen.  Was evaluated for his gallbladder recently but states was told to return if anything worsens\".  The patient states that the Toradol he was given in the ER was enough to improve his pain.  Patient states the pain is in his right upper quadrant radiates to his back.    Denies significant pain on examination.  Did have some improvement with Toradol.  The patient's case was discussed with general surgeon Dr. Thompson, CAT scan was obtained and demonstrated acute cholecystitis.    Patient was in agreement with inpatient stay and surgical intervention.      Differential diagnosis was included but not limited to: GERD, gastritis, PUD, esophageal spasm, pancreatitis, acute cholecystitis, acute cholangitis, biliary colic, " acute cystitis, renal colic, kidney stone, MSK pain, diverticulitis, constipation, proctitis, small bowel obstruction, large bowel obstruction, mass, viral syndrome      Amount and/or Complexity of Data Reviewed  Labs: ordered. Decision-making details documented in ED Course.  Radiology: ordered and independent interpretation performed. Decision-making details documented in ED Course.  ECG/medicine tests: ordered and independent interpretation performed. Decision-making details documented in ED Course.    Risk  Prescription drug management.  Decision regarding hospitalization.             Medications   sodium chloride 0.9 % bolus 1,000 mL (0 mL Intravenous Stopped 4/8/25 2000)   ketorolac (TORADOL) injection 15 mg (15 mg Intravenous Given 4/8/25 1821)   ondansetron (ZOFRAN) injection 4 mg (4 mg Intravenous Given 4/8/25 1821)   iohexol (OMNIPAQUE) 350 MG/ML injection (MULTI-DOSE) 75 mL (75 mL Intravenous Given 4/8/25 1904)   piperacillin-tazobactam (ZOSYN) 4.5 g in sodium chloride 0.9 % 100 mL IVPB (0 g Intravenous Stopped 4/8/25 2006)   indocyanine green (IC-GREEN) injection 5 mg (has no administration in time range)       ED Risk Strat Scores                    No data recorded        SBIRT 22yo+      Flowsheet Row Most Recent Value   Initial Alcohol Screen: US AUDIT-C     1. How often do you have a drink containing alcohol? 0 Filed at: 04/08/2025 1747   2. How many drinks containing alcohol do you have on a typical day you are drinking?  0 Filed at: 04/08/2025 1747   3a. Male UNDER 65: How often do you have five or more drinks on one occasion? 0 Filed at: 04/08/2025 1747   Audit-C Score 0 Filed at: 04/08/2025 1747   REBEKAH: How many times in the past year have you...    Used an illegal drug or used a prescription medication for non-medical reasons? Never Filed at: 04/08/2025 1747                            History of Present Illness       Chief Complaint   Patient presents with    Abdominal Pain     Patient reports  "RUQ abd pain. Was seen here yesterday, reports he was dx with gallstones       Past Medical History:   Diagnosis Date    Back pain     Chronic GERD       Past Surgical History:   Procedure Laterality Date    FL RETROGRADE PYELOGRAM  2020    CT CYSTO/URETERO W/LITHOTRIPSY &INDWELL STENT INSRT Left 2020    Procedure: CYSTOSCOPY URETEROSCOPY , RETROGRADE PYELOGRAM AND INSERTION STENT URETERAL;  Surgeon: Mario Rodriges MD;  Location: [facility];  Service: Urology    CT LAPAROSCOPY SURG CHOLECYSTECTOMY N/A 2025    Procedure: CHOLECYSTECTOMY LAPAROSCOPIC W/ ROBOTICS;  Surgeon: Irlanda Thompson DO;  Location: [facility];  Service: General    SPINAL FUSION        History reviewed. No pertinent family history.   Social History     Tobacco Use    Smoking status: Former     Current packs/day: 0.00     Average packs/day: 1 pack/day for 1 year (1.0 ttl pk-yrs)     Types: Cigarettes     Start date:      Quit date:      Years since quittin.2    Smokeless tobacco: Never    Tobacco comments:     Smoked for about 1 year in his 20's.   Vaping Use    Vaping status: Never Used   Substance Use Topics    Alcohol use: Never    Drug use: Never      E-Cigarette/Vaping    E-Cigarette Use Never User       E-Cigarette/Vaping Substances    Nicotine No     THC No     CBD No     Flavoring No       I have reviewed and agree with the history as documented.     The patient is a 54-year-old male who presents for worsening abdominal pain.  The patient started having some nausea but took Zofran prior to arrival.  Patient states that last evening he began having worsening pain in his upper abdomen.  Was evaluated for his gallbladder recently but states was told to return if anything worsens\".  The patient states that the Toradol he was given in the ER was enough to improve his pain.  Patient states the pain is in his right upper quadrant radiates to his back.          Review of Systems   All other systems reviewed " and are negative.          Objective       ED Triage Vitals   Temperature Pulse Blood Pressure Respirations SpO2 Patient Position - Orthostatic VS   04/08/25 1746 04/08/25 1746 04/08/25 1746 04/08/25 1746 04/08/25 1746 04/08/25 1746   99.5 °F (37.5 °C) 102 134/87 19 99 % Sitting      Temp Source Heart Rate Source BP Location FiO2 (%) Pain Score    04/08/25 1746 04/08/25 1746 04/08/25 1746 -- 04/08/25 1819    Temporal Monitor Left arm  10 - Worst Possible Pain      Vitals      Date and Time Temp Pulse SpO2 Resp BP Pain Score FACES Pain Rating User   04/10/25 0840 -- -- -- -- -- 6 -- ECA   04/10/25 0700 98.8 °F (37.1 °C) 100 -- 18 126/78 -- -- ECA   04/10/25 0255 97.3 °F (36.3 °C) -- -- 18 128/77 -- -- DII   04/09/25 2300 -- -- 96 % -- -- No Pain -- MM   04/09/25 2243 98.6 °F (37 °C) -- -- 18 -- -- -- MM   04/09/25 2243 -- 85 96 % -- 128/77 -- -- DII   04/09/25 1928 97.9 °F (36.6 °C) -- -- -- -- -- -- SW   04/09/25 1928 -- 88 95 % 18 126/74 -- -- DII   04/09/25 1707 -- -- -- -- -- 10 - Worst Possible Pain -- ND   04/09/25 1506 97.3 °F (36.3 °C) 89 93 % 18 128/80 -- -- DII   04/09/25 0837 -- -- 95 % -- -- 3 -- ND   04/09/25 0702 97.5 °F (36.4 °C) 99 92 % 18 134/84 -- -- DII   04/09/25 0617 -- 99 94 % -- -- 4 -- GH   04/09/25 0318 98.3 °F (36.8 °C) -- -- -- -- -- -- SL   04/09/25 0318 -- 103 96 % 17 138/86 -- -- DII   04/09/25 0151 -- 98 93 % -- -- -- -- GH   04/09/25 0025 -- -- 90 % -- -- -- -- GH   04/09/25 0024 -- -- 87 % -- -- 6 -- GH   04/09/25 0000 99.3 °F (37.4 °C) 102 92 % 16 137/70 2 -- LANI   04/08/25 2345 -- 108 94 % 16 142/73 2 -- LANI   04/08/25 2330 100.4 °F (38 °C) 107 94 % 14 144/74 -- -- LANI   04/08/25 1819 -- -- -- -- -- 10 - Worst Possible Pain -- AS   04/08/25 1746 99.5 °F (37.5 °C) 102 99 % 19 134/87 -- -- KK            Physical Exam  Vitals and nursing note reviewed.   Constitutional:       General: He is in acute distress.      Appearance: He is well-developed.   HENT:      Head: Normocephalic and  atraumatic.   Eyes:      Extraocular Movements: Extraocular movements intact.      Pupils: Pupils are equal, round, and reactive to light.   Cardiovascular:      Rate and Rhythm: Normal rate and regular rhythm.      Heart sounds: No murmur heard.  Pulmonary:      Effort: Pulmonary effort is normal. No respiratory distress.      Breath sounds: Normal breath sounds.   Abdominal:      General: Bowel sounds are normal.      Palpations: Abdomen is soft.      Tenderness: There is abdominal tenderness in the right upper quadrant. There is rebound.   Musculoskeletal:      Cervical back: Normal range of motion.   Skin:     General: Skin is warm and dry.      Capillary Refill: Capillary refill takes less than 2 seconds.   Neurological:      Mental Status: He is alert and oriented to person, place, and time.   Psychiatric:         Behavior: Behavior normal.         Results Reviewed       Procedure Component Value Units Date/Time    Urine Microscopic [[id number]]  (Abnormal) Collected: 04/08/25 2014    Lab Status: Final result Specimen: Urine, Clean Catch Updated: 04/08/25 2128     RBC, UA 4-10 /hpf      WBC, UA 2-4 /hpf      Epithelial Cells Occasional /hpf      Bacteria, UA Occasional /hpf     UA w Reflex to Microscopic w Reflex to Culture [[id number]]  (Abnormal) Collected: 04/08/25 2014    Lab Status: Final result Specimen: Urine, Clean Catch Updated: 04/08/25 2037     Color, UA Juany     Clarity, UA Clear     Specific Gravity, UA 1.015     pH, UA 5.5     Leukocytes, UA Negative     Nitrite, UA Positive     Protein, UA 30 (1+) mg/dl      Glucose, UA Negative mg/dl      Ketones, UA 40 (2+) mg/dl      Urobilinogen, UA 1.0 E.U./dl      Bilirubin, UA Large     Occult Blood, UA Large    HS Troponin 0hr (reflex protocol) [[id number]]  (Normal) Collected: 04/08/25 1819    Lab Status: Final result Specimen: Blood from Arm, Left Updated: 04/08/25 1853     hs TnI 0hr 6 ng/L     Comprehensive metabolic panel [[id number]]  (Abnormal)  Collected: 04/08/25 1819    Lab Status: Final result Specimen: Blood from Arm, Left Updated: 04/08/25 1850     Sodium 135 mmol/L      Potassium 3.8 mmol/L      Chloride 99 mmol/L      CO2 27 mmol/L      ANION GAP 9 mmol/L      BUN 11 mg/dL      Creatinine 0.61 mg/dL      Glucose 155 mg/dL      Calcium 9.1 mg/dL      AST 78 U/L       U/L      Alkaline Phosphatase 266 U/L      Total Protein 7.0 g/dL      Albumin 4.2 g/dL      Total Bilirubin 4.27 mg/dL      eGFR 114 ml/min/1.73sq m     Narrative:      [employer] guidelines for Chronic Kidney Disease (CKD):     Stage 1 with normal or high GFR (GFR > 90 mL/min/1.73 square meters)    Stage 2 Mild CKD (GFR = 60-89 mL/min/1.73 square meters)    Stage 3A Moderate CKD (GFR = 45-59 mL/min/1.73 square meters)    Stage 3B Moderate CKD (GFR = 30-44 mL/min/1.73 square meters)    Stage 4 Severe CKD (GFR = 15-29 mL/min/1.73 square meters)    Stage 5 End Stage CKD (GFR <15 mL/min/1.73 square meters)  Note: GFR calculation is accurate only with a steady state creatinine    Lipase [[id number]]  (Normal) Collected: 04/08/25 1819    Lab Status: Final result Specimen: Blood from Arm, Left Updated: 04/08/25 1850     Lipase 12 u/L     Lactic acid, plasma (w/reflex if result > 2.0) [[id number]]  (Normal) Collected: 04/08/25 1819    Lab Status: Final result Specimen: Blood from Arm, Left Updated: 04/08/25 1845     LACTIC ACID 1.5 mmol/L     Narrative:      Result may be elevated if tourniquet was used during collection.    CBC and differential [[id number]]  (Abnormal) Collected: 04/08/25 1819    Lab Status: Final result Specimen: Blood from Arm, Left Updated: 04/08/25 1830     WBC 14.30 Thousand/uL      RBC 5.36 Million/uL      Hemoglobin 13.7 g/dL      Hematocrit 42.7 %      MCV 80 fL      MCH 25.6 pg      MCHC 32.1 g/dL      RDW 14.7 %      MPV 9.4 fL      Platelets 210 Thousands/uL      nRBC 0 /100 WBCs      Segmented % 87 %      Immature Grans % 0 %       Lymphocytes % 6 %      Monocytes % 7 %      Eosinophils Relative 0 %      Basophils Relative 0 %      Absolute Neutrophils 12.41 Thousands/µL      Absolute Immature Grans 0.05 Thousand/uL      Absolute Lymphocytes 0.79 Thousands/µL      Absolute Monocytes 1.00 Thousand/µL      Eosinophils Absolute 0.01 Thousand/µL      Basophils Absolute 0.04 Thousands/µL             CT abdomen pelvis with contrast   Final Interpretation by E. Alec Schoenberger, MD ([id number])      Emphysematous cholecystitis.         Findings were discussed with Irlanda Thompson via HIPAA compliant secure electronic messaging at 7:19 p.m.         Workstation performed: [id number]         XR chest 1 view portable   Final Interpretation by Jonathan Jimenez MD ([id number])      No radiographic evidence of acute intrathoracic process.            Workstation performed: [id number]             ECG 12 Lead Documentation Only    Date/Time: 2025 8:10 PM    Performed by: Luis Huynh PA-C  Authorized by: Luis Huynh PA-C    Indications / Diagnosis:  Abdominal pain  ECG reviewed by me, the ED Provider: yes    Patient location:  ED  Previous ECG:     Previous ECG:  Unavailable  Interpretation:     Interpretation: non-specific    Rate:     ECG rate:  90    ECG rate assessment: tachycardic    Rhythm:     Rhythm: sinus tachycardia        ED Medication and Procedure Management   Prior to Admission Medications   Prescriptions Last Dose Informant Patient Reported? Taking?   Cholecalciferol 125 MCG (5000 UT) capsule 2025  Yes Yes   Sig: Take 1 capsule by mouth daily   buprenorphine-naloxone (SUBOXONE) 8-2 mg per SL tablet 2025 Self Yes Yes   Sig: Place 1 tablet under the tongue daily   carboxymethylcellulose (Refresh Plus) 0.5 % SOLN 2025  Yes Yes   Sig: INSTILL 1 DROP IN EACH EYE EVERY 2 HOURS WHILE AWAKE AS NEEDED FOR DRY EYES   ibuprofen (MOTRIN) 600 mg tablet 2025  Yes Yes   Si mg    lisinopril-hydrochlorothiazide (PRINZIDE,ZESTORETIC) 20-12.5 MG per tablet 2025  Yes Yes   Sig: Take 1 tablet by mouth daily   metFORMIN (GLUCOPHAGE) 500 mg tablet 2025  Yes Yes   Sig: Take 500 mg by mouth 2 (two) times a day with meals   methocarbamol (ROBAXIN) 500 mg tablet Not Taking  Yes No   Si mg   Patient not taking: Reported on 2025   ondansetron (ZOFRAN) 4 mg tablet 2025  No Yes   Sig: Take 1 tablet (4 mg total) by mouth every 6 (six) hours   semaglutide, 1 mg/dose, (Ozempic) 2 mg/1.5 mL injection pen 2025  Yes Yes   Sig: Inject 2 mg under the skin every 7 days Last taken       Facility-Administered Medications: None     Discharge Medication List as of 4/10/2025 11:48 AM        START taking these medications    Details   levofloxacin (LEVAQUIN) 750 mg tablet Take 1 tablet (750 mg total) by mouth every 24 hours for 7 days, Starting u 4/10/2025, Until 2025, Normal      metroNIDAZOLE (FLAGYL) 500 mg tablet Take 1 tablet (500 mg total) by mouth every 8 (eight) hours for 7 days, Starting Thu 4/10/2025, Until 2025, Normal      pantoprazole (PROTONIX) 20 mg tablet Take 1 tablet (20 mg total) by mouth daily in the early morning, Starting 2025, No Print           CONTINUE these medications which have NOT CHANGED    Details   buprenorphine-naloxone (SUBOXONE) 8-2 mg per SL tablet Place 1 tablet under the tongue daily, Historical Med      carboxymethylcellulose (Refresh Plus) 0.5 % SOLN INSTILL 1 DROP IN EACH EYE EVERY 2 HOURS WHILE AWAKE AS NEEDED FOR DRY EYES, Historical Med      Cholecalciferol 125 MCG (5000 UT) capsule Take 1 capsule by mouth daily, Starting Mon 10/23/2023, Historical Med      ibuprofen (MOTRIN) 600 mg tablet 600 mg, Starting Wed 2024, Historical Med      lisinopril-hydrochlorothiazide (PRINZIDE,ZESTORETIC) 20-12.5 MG per tablet Take 1 tablet by mouth daily, Historical Med      metFORMIN (GLUCOPHAGE) 500 mg tablet Take 500  mg by mouth 2 (two) times a day with meals, Historical Med      semaglutide, 1 mg/dose, (Ozempic) 2 mg/1.5 mL injection pen Inject 2 mg under the skin every 7 days Last taken Saturday 4/5, Historical Med           STOP taking these medications       ondansetron (ZOFRAN) 4 mg tablet Comments:   Reason for Stopping:         methocarbamol (ROBAXIN) 500 mg tablet Comments:   Reason for Stopping:             Outpatient Discharge Orders   Discharge Diet     Lifting restrictions     No strenuous exercise     Shower on day dressing removed (No bath)     No driving until     May return to work/school on:     No dressing needed     Call provider for:  persistent nausea or vomiting     Call provider for:  severe uncontrolled pain     Call provider for:  redness, tenderness, or signs of infection (pain, swelling, redness, odor or green/yellow discharge around incision site)     Call provider for: active or persistent bleeding     Call provider for:  difficulty breathing, headache or visual disturbances     Follow-up with surgeon in 1-2 weeks     ED SEPSIS DOCUMENTATION   Time reflects when diagnosis was documented in both MDM as applicable and the Disposition within this note       Time User Action Codes Description Comment    4/8/2025  7:20 PM Luis Huynh Add [K81.0] Acute cholecystitis     4/8/2025  7:46 PM Irlanda Thompson Add [K81.0] Emphysematous cholecystitis     4/8/2025  7:46 PM Irlanda Thompson Add [I10] Hypertension, unspecified type     4/8/2025  7:47 PM Irlanda Thompson Add [F11.90] Chronic, continuous use of opioids     4/8/2025 10:29 PM Natali Lin Modify [K81.0] Acute cholecystitis     4/8/2025 11:32 PM Billig, Katrina Modify [K81.0] Acute cholecystitis                  Luis Huynh PA-C  04/08/25 2011       Luis Huynh PA-C  04/12/25 1913

## 2025-04-08 NOTE — H&P
H&P - Surgery-General   Name: Lee Shepherd 54 y.o. male I MRN: [id number]  Unit/Bed#: ED 08 I Date of Admission: 4/8/2025   Date of Service: 4/8/2025 I Hospital Day: 0     Assessment & Plan  Emphysematous cholecystitis  54-year-old male with a history of hypertension and chronic opioid dependence who presents due to worsening right upper quadrant abdominal pain.  The patient once seen in the emergency room yesterday due to more mild right upper quadrant pain.  This resolved with only Toradol administration.  Today, he states his pain is much worse.  He has only had sips of water and a small piece of granola bar at noon today.  He denies any fever or chills.  He has had some nausea.    On examination the patient is significantly tender in the right upper quadrant.  He is afebrile and stable.  He is pale in appearance.    Laboratory results significant for leukocytosis of 14.  LFTs also elevated with a total bilirubin of 4.27.    CT scan of the abdomen and pelvis shows emphysematous cholecystitis.    Plan:  Due to the presence of emphysematous cholecystitis and high risk for sepsis, will take to the operating room emergently for robotic assisted laparoscopic cholecystectomy, possible open.  The procedure was discussed with the patient in detail.  Risks were discussed including biliary injury, bowel injury, bleeding, and infection.  The patient expressed understanding and agrees to proceed  Patient will be admitted postoperatively to surgical service, anticipate [facility] level of care  [facility] on-call to the OR      Hypertension, unspecified type  Continue home medications  Chronic, continuous use of opioids  Continue home dose of Suboxone    History of Present Illness   Lee Shepherd is a 54 y.o. male who presents with worsening right upper quadrant abdominal pain.  The patient was evaluated in the emergency room yesterday.  At that time, his right upper quadrant pain resolved with 1 dose of Toradol.  The patient  was able to be discharged home.  The patient states his pain acutely worsened earlier this afternoon.  He has only been able to tolerate small sips of water and a small piece of granola bar around noon today.  He has had nausea but no vomiting.  He denies any fever or chills..    Review of Systems   Constitutional:  Negative for chills and fever.   HENT: Negative.     Respiratory: Negative.     Cardiovascular: Negative.    Gastrointestinal:  Positive for abdominal pain and nausea.   Genitourinary: Negative.    Musculoskeletal: Negative.    Skin: Negative.    Neurological: Negative.    Hematological: Negative.      Historical Information   Past Medical History:   Diagnosis Date    Back pain     Chronic GERD      Past Surgical History:   Procedure Laterality Date    FL RETROGRADE PYELOGRAM  07/12/2020    [place] CYSTO/URETERO W/LITHOTRIPSY &INDWELL STENT INSRT Left 07/12/2020    Procedure: CYSTOSCOPY URETEROSCOPY , RETROGRADE PYELOGRAM AND INSERTION STENT URETERAL;  Surgeon: Mario Rodriges MD;  Location: [facility];  Service: Urology    SPINAL FUSION       Social History     Tobacco Use    Smoking status: Unknown    Smokeless tobacco: Never   Vaping Use    Vaping status: Never Used   Substance and Sexual Activity    Alcohol use: Never    Drug use: Never    Sexual activity: Not Currently     E-Cigarette/Vaping    E-Cigarette Use Never User      E-Cigarette/Vaping Substances    Nicotine No     THC No     CBD No     Flavoring No      Family history non-contributory    Objective :  Temp:  [99.5 °F (37.5 °C)] 99.5 °F (37.5 °C)  HR:  [102] 102  BP: (134)/(87) 134/87  Resp:  [19] 19  SpO2:  [99 %] 99 %  O2 Device: None (Room air)      Physical Exam  Constitutional:       Appearance: Normal appearance.   HENT:      Head: Normocephalic and atraumatic.      Mouth/Throat:      Mouth: Mucous membranes are moist.   Cardiovascular:      Rate and Rhythm: Normal rate and regular rhythm.   Pulmonary:      Effort: Pulmonary effort is  normal.      Breath sounds: Normal breath sounds.   Abdominal:      General: There is no distension.      Palpations: Abdomen is soft.      Tenderness: There is abdominal tenderness (RUQ). There is guarding (RUQ).   Musculoskeletal:         General: Normal range of motion.   Skin:     General: Skin is warm and dry.      Coloration: Skin is pale.   Neurological:      General: No focal deficit present.      Mental Status: He is alert and oriented to person, place, and time.         Lab Results: I have reviewed the following results:  Recent Labs     04/08/25  1819   WBC 14.30*   HGB 13.7   HCT 42.7      SODIUM 135   K 3.8   CL 99   CO2 27   BUN 11   CREATININE 0.61   GLUC 155*   AST 78*   *   ALB 4.2   TBILI 4.27*   ALKPHOS 266*   HSTNI0 6   LACTICACID 1.5       Imaging Results Review: I personally reviewed the following image studies in PACS and associated radiology reports: CT abdomen/pelvis. My interpretation of the radiology images/reports is: I personally reviewed these images and I agree with the finding of emphysematous cholecystitis.  Other Study Results Review: No additional pertinent studies reviewed.    VTE Pharmacologic Prophylaxis: Heparin  VTE Mechanical Prophylaxis: sequential compression device

## 2025-04-08 NOTE — Clinical Note
Case was discussed with  and the patient's admission status was agreed to be Admission Status: inpatient status to the service of Dr. Thompson  .

## 2025-04-08 NOTE — ASSESSMENT & PLAN NOTE
54-year-old male with a history of hypertension and chronic opioid dependence who presents due to worsening right upper quadrant abdominal pain.  The patient once seen in the emergency room yesterday due to more mild right upper quadrant pain.  This resolved with only Toradol administration.  Today, he states his pain is much worse.  He has only had sips of water and a small piece of granola bar at noon today.  He denies any fever or chills.  He has had some nausea.    On examination the patient is significantly tender in the right upper quadrant.  He is afebrile and stable.  He is pale in appearance.    Laboratory results significant for leukocytosis of 14.  LFTs also elevated with a total bilirubin of 4.27.    CT scan of the abdomen and pelvis shows emphysematous cholecystitis.    Plan:  Due to the presence of emphysematous cholecystitis and high risk for sepsis, will take to the operating room emergently for robotic assisted laparoscopic cholecystectomy, possible open.  The procedure was discussed with the patient in detail.  Risks were discussed including biliary injury, bowel injury, bleeding, and infection.  The patient expressed understanding and agrees to proceed  Patient will be admitted postoperatively to surgical service, [facility] level of care  [facility] on-call to the OR

## 2025-04-09 LAB
ACANTHOCYTES BLD QL SMEAR: PRESENT
ALBUMIN SERPL BCG-MCNC: 3.7 G/DL (ref 3.5–5)
ALP SERPL-CCNC: 238 U/L (ref 34–104)
ALT SERPL W P-5'-P-CCNC: 102 U/L (ref 7–52)
ANION GAP SERPL CALCULATED.3IONS-SCNC: 8 MMOL/L (ref 4–13)
ANISOCYTOSIS BLD QL SMEAR: PRESENT
AST SERPL W P-5'-P-CCNC: 55 U/L (ref 13–39)
ATRIAL RATE: 90 BPM
BASOPHILS # BLD MANUAL: 0 THOUSAND/UL (ref 0–0.1)
BASOPHILS NFR MAR MANUAL: 0 % (ref 0–1)
BILIRUB SERPL-MCNC: 4.75 MG/DL (ref 0.2–1)
BUN SERPL-MCNC: 10 MG/DL (ref 5–25)
CALCIUM SERPL-MCNC: 8.6 MG/DL (ref 8.4–10.2)
CHLORIDE SERPL-SCNC: 102 MMOL/L (ref 96–108)
CO2 SERPL-SCNC: 25 MMOL/L (ref 21–32)
CREAT SERPL-MCNC: 0.61 MG/DL (ref 0.6–1.3)
EOSINOPHIL # BLD MANUAL: 0 THOUSAND/UL (ref 0–0.4)
EOSINOPHIL NFR BLD MANUAL: 0 % (ref 0–6)
ERYTHROCYTE [DISTWIDTH] IN BLOOD BY AUTOMATED COUNT: 14.7 % (ref 11.6–15.1)
EST. AVERAGE GLUCOSE BLD GHB EST-MCNC: 126 MG/DL
GFR SERPL CREATININE-BSD FRML MDRD: 114 ML/MIN/1.73SQ M
GLUCOSE SERPL-MCNC: 179 MG/DL (ref 65–140)
GLUCOSE SERPL-MCNC: 189 MG/DL (ref 65–140)
GLUCOSE SERPL-MCNC: 204 MG/DL (ref 65–140)
GLUCOSE SERPL-MCNC: 222 MG/DL (ref 65–140)
HBA1C MFR BLD: 6 %
HCT VFR BLD AUTO: 39.4 % (ref 36.5–49.3)
HGB BLD-MCNC: 12.5 G/DL (ref 12–17)
HYPERCHROMIA BLD QL SMEAR: PRESENT
LYMPHOCYTES # BLD AUTO: 0.76 THOUSAND/UL (ref 0.6–4.47)
LYMPHOCYTES # BLD AUTO: 6 % (ref 14–44)
MCH RBC QN AUTO: 24.7 PG (ref 26.8–34.3)
MCHC RBC AUTO-ENTMCNC: 31.7 G/DL (ref 31.4–37.4)
MCV RBC AUTO: 78 FL (ref 82–98)
MICROCYTES BLD QL AUTO: PRESENT
MONOCYTES # BLD AUTO: 0.13 THOUSAND/UL (ref 0–1.22)
MONOCYTES NFR BLD: 1 % (ref 4–12)
NEUTROPHILS # BLD MANUAL: 11.76 THOUSAND/UL (ref 1.85–7.62)
NEUTS BAND NFR BLD MANUAL: 1 % (ref 0–8)
NEUTS SEG NFR BLD AUTO: 92 % (ref 43–75)
P AXIS: 55 DEGREES
PLATELET # BLD AUTO: 181 THOUSANDS/UL (ref 149–390)
PLATELET BLD QL SMEAR: ADEQUATE
PMV BLD AUTO: 9.5 FL (ref 8.9–12.7)
POIKILOCYTOSIS BLD QL SMEAR: PRESENT
POTASSIUM SERPL-SCNC: 3.9 MMOL/L (ref 3.5–5.3)
PR INTERVAL: 158 MS
PROT SERPL-MCNC: 6.5 G/DL (ref 6.4–8.4)
QRS AXIS: 28 DEGREES
QRSD INTERVAL: 96 MS
QT INTERVAL: 364 MS
QTC INTERVAL: 445 MS
RBC # BLD AUTO: 5.06 MILLION/UL (ref 3.88–5.62)
RBC MORPH BLD: PRESENT
SODIUM SERPL-SCNC: 135 MMOL/L (ref 135–147)
T WAVE AXIS: 50 DEGREES
VENTRICULAR RATE: 90 BPM
WBC # BLD AUTO: 12.64 THOUSAND/UL (ref 4.31–10.16)

## 2025-04-09 PROCEDURE — 80053 COMPREHEN METABOLIC PANEL: CPT | Performed by: STUDENT IN AN ORGANIZED HEALTH CARE EDUCATION/TRAINING PROGRAM

## 2025-04-09 PROCEDURE — 85027 COMPLETE CBC AUTOMATED: CPT | Performed by: STUDENT IN AN ORGANIZED HEALTH CARE EDUCATION/TRAINING PROGRAM

## 2025-04-09 PROCEDURE — 85007 BL SMEAR W/DIFF WBC COUNT: CPT | Performed by: STUDENT IN AN ORGANIZED HEALTH CARE EDUCATION/TRAINING PROGRAM

## 2025-04-09 PROCEDURE — 85025 COMPLETE CBC W/AUTO DIFF WBC: CPT | Performed by: STUDENT IN AN ORGANIZED HEALTH CARE EDUCATION/TRAINING PROGRAM

## 2025-04-09 PROCEDURE — 83036 HEMOGLOBIN GLYCOSYLATED A1C: CPT

## 2025-04-09 PROCEDURE — 93010 ELECTROCARDIOGRAM REPORT: CPT | Performed by: INTERNAL MEDICINE

## 2025-04-09 PROCEDURE — 82948 REAGENT STRIP/BLOOD GLUCOSE: CPT

## 2025-04-09 RX ORDER — INSULIN LISPRO 100 [IU]/ML
1-5 INJECTION, SOLUTION INTRAVENOUS; SUBCUTANEOUS
Status: DISCONTINUED | OUTPATIENT
Start: 2025-04-09 | End: 2025-04-10 | Stop reason: HOSPADM

## 2025-04-09 RX ADMIN — BUPRENORPHINE AND NALOXONE 8 MG: 8; 2 FILM BUCCAL; SUBLINGUAL at 08:36

## 2025-04-09 RX ADMIN — METRONIDAZOLE 500 MG: 500 INJECTION, SOLUTION INTRAVENOUS at 00:22

## 2025-04-09 RX ADMIN — SODIUM CHLORIDE 125 ML/HR: 0.9 INJECTION, SOLUTION INTRAVENOUS at 09:25

## 2025-04-09 RX ADMIN — HYDROCHLOROTHIAZIDE 12.5 MG: 12.5 TABLET ORAL at 08:36

## 2025-04-09 RX ADMIN — INSULIN LISPRO 1 UNITS: 100 INJECTION, SOLUTION INTRAVENOUS; SUBCUTANEOUS at 17:07

## 2025-04-09 RX ADMIN — METRONIDAZOLE 500 MG: 500 INJECTION, SOLUTION INTRAVENOUS at 22:40

## 2025-04-09 RX ADMIN — HEPARIN SODIUM 5000 UNITS: 5000 INJECTION INTRAVENOUS; SUBCUTANEOUS at 06:17

## 2025-04-09 RX ADMIN — INSULIN LISPRO 1 UNITS: 100 INJECTION, SOLUTION INTRAVENOUS; SUBCUTANEOUS at 22:40

## 2025-04-09 RX ADMIN — KETOROLAC TROMETHAMINE 15 MG: 30 INJECTION, SOLUTION INTRAMUSCULAR; INTRAVENOUS at 17:07

## 2025-04-09 RX ADMIN — SODIUM CHLORIDE 125 ML/HR: 0.9 INJECTION, SOLUTION INTRAVENOUS at 18:43

## 2025-04-09 RX ADMIN — KETOROLAC TROMETHAMINE 15 MG: 30 INJECTION, SOLUTION INTRAMUSCULAR; INTRAVENOUS at 06:17

## 2025-04-09 RX ADMIN — HEPARIN SODIUM 5000 UNITS: 5000 INJECTION INTRAVENOUS; SUBCUTANEOUS at 22:39

## 2025-04-09 RX ADMIN — KETOROLAC TROMETHAMINE 15 MG: 30 INJECTION, SOLUTION INTRAMUSCULAR; INTRAVENOUS at 00:24

## 2025-04-09 RX ADMIN — ONDANSETRON 4 MG: 2 INJECTION INTRAMUSCULAR; INTRAVENOUS at 17:07

## 2025-04-09 RX ADMIN — CEFTRIAXONE 1000 MG: 1 INJECTION, SOLUTION INTRAVENOUS at 07:07

## 2025-04-09 RX ADMIN — LISINOPRIL 20 MG: 20 TABLET ORAL at 08:35

## 2025-04-09 RX ADMIN — METRONIDAZOLE 500 MG: 500 INJECTION, SOLUTION INTRAVENOUS at 11:49

## 2025-04-09 RX ADMIN — HEPARIN SODIUM 5000 UNITS: 5000 INJECTION INTRAVENOUS; SUBCUTANEOUS at 14:30

## 2025-04-09 RX ADMIN — SODIUM CHLORIDE 125 ML/HR: 0.9 INJECTION, SOLUTION INTRAVENOUS at 00:21

## 2025-04-09 RX ADMIN — INSULIN LISPRO 2 UNITS: 100 INJECTION, SOLUTION INTRAVENOUS; SUBCUTANEOUS at 11:53

## 2025-04-09 NOTE — ASSESSMENT & PLAN NOTE
54-year-old male a history of hypertension and chronic opioid dependence who is now POD1 s/p robotic assisted laparoscopic cholecystectomy     Intra-op Findings:   - Gangrenous acute cholecystitis with findings consistent with emphysematous cholecystitis. Thick purulent bile within the gallbladder. Critical view obtained.     - Afebrile, VSS on room air since surgery  - Leukocytosis improving 12 (14)  - Hgb 12 (13)  - BMP unremarkable   - AST 55 (78),  (131), Alk Phos 238 (266)  - Tbili 4.75 (4.27)  - Tissue exam pending     - Two doses of Toradol so far today for pain    - PMHx: GERD, T2DM on Ozempic (last dose 2 days ago), kidney stones  - PSHx: Lumbar fusion, cystoscopies    Plan:  - Diet advanced to surgical soft for lunch   - IVF hydration discontinued  - IV abx to be continued - Ceftriaxone, Flagyl, could convert to Augmentin on d/c  - Trend LFTs and Tbili - LFTs noted to be down trending today, but Tbili remains in the 4 range  - Pain/nausea control PRN   - Encourage ambulation   - Continue home meds, insulin sliding scale added

## 2025-04-09 NOTE — ANESTHESIA PREPROCEDURE EVALUATION
Procedure:  CHOLECYSTECTOMY LAPAROSCOPIC W/ ROBOTICS (Abdomen)    Relevant Problems   CARDIO   (+) Hypertensive urgency   (+) Secondary hypertension      /RENAL   (+) Acute kidney injury (HCC)   (+) Nephrolithiasis      Digestive   (+) Emphysematous cholecystitis   (+) Symptomatic Cholecystitis      Behavioral Health   (+) Opioid dependence in remission (HCC)      Other   (+) Transaminitis        Physical Exam    Airway    Mallampati score: II         Dental       Cardiovascular      Pulmonary      Other Findings        Anesthesia Plan  ASA Score- 3 Emergent    Anesthesia Type- general with ASA Monitors.         Additional Monitors:     Airway Plan: ETT.           Plan Factors-    Chart reviewed.                      Induction- intravenous.    Postoperative Plan- Plan for postoperative opioid use. Planned trial extubation        Informed Consent- Anesthetic plan and risks discussed with patient.  I personally reviewed this patient with the CRNA. Discussed and agreed on the Anesthesia Plan with the CRNA..      NPO Status:  No vitals data found for the desired time range.

## 2025-04-09 NOTE — ANESTHESIA POSTPROCEDURE EVALUATION
Post-Op Assessment Note    CV Status:  Stable  Pain Score: 0    Pain management: adequate       Mental Status:  Alert and awake   Hydration Status:  Stable   PONV Controlled:  Controlled   Airway Patency:  Patent     Post Op Vitals Reviewed: Yes    No anethesia notable event occurred.    Staff: CRNA           Last Filed PACU Vitals:  Vitals Value Taken Time   Temp 100.4 °F (38 °C) 04/08/25 2330   Pulse 107 04/08/25 2330   /74 04/08/25 2330   Resp 14 04/08/25 2330   SpO2 94 % 04/08/25 2330               TRANSFER - OUT REPORT:    Verbal report given to Heydi(name) on Gustavo Barnard  being transferred to 8th floor (unit) for routine progression of care       Report consisted of patients Situation, Background, Assessment and   Recommendations(SBAR). Information from the following report(s) SBAR, ED Summary and MAR was reviewed with the receiving nurse. Lines:   Peripheral IV 07/11/21 Left Hand (Active)        Opportunity for questions and clarification was provided.       Patient transported with:   [employer]

## 2025-04-09 NOTE — PLAN OF CARE
Problem: Potential for Falls  Goal: Patient will remain free of falls  Description: INTERVENTIONS:- Educate patient/family on patient safety including physical limitations- Instruct patient to call for assistance with activity - Consult OT/PT to assist with strengthening/mobility - Keep Call bell within reach- Keep bed low and locked with side rails adjusted as appropriate- Keep care items and personal belongings within reach- Initiate and maintain comfort rounds- Make Fall Risk Sign visible to staff- Offer Toileting every  Hours, in advance of need- Initiate/Maintain alarm- Obtain necessary fall risk management equipment: - Apply yellow socks and bracelet for high fall risk patients- Consider moving patient to room near nurses station  INTERVENTIONS:- Educate patient/family on patient safety including physical limitations- Instruct patient to call for assistance with activity - Consult OT/PT to assist with strengthening/mobility - Keep Call bell within reach- Keep bed low and locked with side rails adjusted as appropriate- Keep care items and personal belongings within reach- Initiate and maintain comfort rounds- Make Fall Risk Sign visible to staff- Offer Toileting every  Hours, in advance of need- Initiate/Maintain alarm- Obtain necessary fall risk management equipment: - Apply yellow socks and bracelet for high fall risk patients- Consider moving patient to room near nurses station  Reactivated     Problem: PAIN - ADULT  Goal: Verbalizes/displays adequate comfort level or baseline comfort level  Description: Interventions:- Encourage patient to monitor pain and request assistance- Assess pain using appropriate pain scale- Administer analgesics based on type and severity of pain and evaluate response- Implement non-pharmacological measures as appropriate and evaluate response- Consider cultural and social influences on pain and pain management- Notify physician/advanced practitioner if interventions  unsuccessful or patient reports new pain  Reactivated     Problem: INFECTION - ADULT  Goal: Absence or prevention of progression during hospitalization  Description: INTERVENTIONS:- Assess and monitor for signs and symptoms of infection- Monitor lab/diagnostic results- Monitor all insertion sites, i.e. indwelling lines, tubes, and drains- Monitor endotracheal if appropriate and nasal secretions for changes in amount and color- [place] appropriate cooling/warming therapies per order- Administer medications as ordered- Instruct and encourage patient and family to use good hand hygiene technique- Identify and instruct in appropriate isolation precautions for identified infection/condition  Reactivated  Goal: Absence of fever/infection during neutropenic period  Description: INTERVENTIONS:- Monitor WBC  Reactivated     Problem: SAFETY ADULT  Goal: Patient will remain free of falls  Description: INTERVENTIONS:- Educate patient/family on patient safety including physical limitations- Instruct patient to call for assistance with activity - Consult OT/PT to assist with strengthening/mobility - Keep Call bell within reach- Keep bed low and locked with side rails adjusted as appropriate- Keep care items and personal belongings within reach- Initiate and maintain comfort rounds- Make Fall Risk Sign visible to staff- Offer Toileting every  Hours, in advance of need- Initiate/Maintain alarm- Obtain necessary fall risk management equipment: - Apply yellow socks and bracelet for high fall risk patients- Consider moving patient to room near nurses station  INTERVENTIONS:- Educate patient/family on patient safety including physical limitations- Instruct patient to call for assistance with activity - Consult OT/PT to assist with strengthening/mobility - Keep Call bell within reach- Keep bed low and locked with side rails adjusted as appropriate- Keep care items and personal belongings within reach- Initiate and maintain comfort  rounds- Make Fall Risk Sign visible to staff- Offer Toileting every  Hours, in advance of need- Initiate/Maintain alarm- Obtain necessary fall risk management equipment: - Apply yellow socks and bracelet for high fall risk patients- Consider moving patient to room near nurses station  Reactivated  Goal: Maintain or return to baseline ADL function  Description: INTERVENTIONS:-  Assess patient's ability to carry out ADLs; assess patient's baseline for ADL function and identify physical deficits which impact ability to perform ADLs (bathing, care of mouth/teeth, toileting, grooming, dressing, etc.)- Assess/evaluate cause of self-care deficits - Assess range of motion- Assess patient's mobility; develop plan if impaired- Assess patient's need for assistive devices and provide as appropriate- Encourage maximum independence but intervene and supervise when necessary- Involve family in performance of ADLs- Assess for home care needs following discharge - Consider OT consult to assist with ADL evaluation and planning for discharge- Provide patient education as appropriate  Reactivated  Goal: Maintains/Returns to pre admission functional level  Description: INTERVENTIONS:- Perform AM-PAC 6 Click Basic Mobility/ Daily Activity assessment daily.- Set and communicate daily mobility goal to care team and patient/family/caregiver. - Collaborate with rehabilitation services on mobility goals if consulted- Perform Range of Motion  times a day.- Reposition patient every  hours.- Dangle patient  times a day- Stand patient  times a day- Ambulate patient  times a day- Out of bed to chair  times a day - Out of bed for meals times a day- Out of bed for toileting- Record patient progress and toleration of activity level   Reactivated     Problem: DISCHARGE PLANNING  Goal: Discharge to home or other facility with appropriate resources  Description: INTERVENTIONS:- Identify barriers to discharge w/patient and caregiver- Arrange for needed  discharge resources and transportation as appropriate- Identify discharge learning needs (meds, wound care, etc.)- Arrange for interpretive services to assist at discharge as needed- Refer to Case Management Department for coordinating discharge planning if the patient needs post-hospital services based on physician/advanced practitioner order or complex needs related to functional status, cognitive ability, or social support system  Reactivated     Problem: Knowledge Deficit  Goal: Patient/family/caregiver demonstrates understanding of disease process, treatment plan, medications, and discharge instructions  Description: Complete learning assessment and assess knowledge base.Interventions:- Provide teaching at level of understanding- Provide teaching via preferred learning methods  Reactivated

## 2025-04-09 NOTE — OP NOTE
OPERATIVE REPORT  PATIENT NAME: Lee Shepherd    :  1970  MRN: [id number]  Pt Location: [facility] OR ROOM 03    SURGERY DATE: 2025    Surgeons and Role:     * Irlanda Thompson DO - Primary     * Katrina Elizabeth Billig, PA-C - Assisting    Preop Diagnosis:  Acute emphysematous cholecystitis [K81.0]    Post-Op Diagnosis Codes:     * Acute emphysematous cholecystitis [K81.0]    Procedure(s):  CHOLECYSTECTOMY LAPAROSCOPIC W/ ROBOTICS    Specimen(s):  ID Type Source Tests Collected by Time Destination   1 : Gallbladder Tissue Gallbladder TISSUE EXAM Irlanda Mejia DO Donna 2025  9:51 PM        Estimated Blood Loss:   Minimal    Drains:  * No LDAs found *    Anesthesia Type:   General    Operative Indications:  Acute emphysematous cholecystitis [K81.0]      Operative Findings:  Gangrenous acute cholecystitis with findings consistent with emphysematous cholecystitis.  Thick purulent bile within the gallbladder.  Critical view obtained.      Complications:   None    Procedure and Technique:  The patient was brought to the operating room.  A time-out was held and the patient was identified and procedure verified.  Appropriate antibiotic prophylaxis and DVT prophylaxis was used.  General anesthesia was administered.  The area of the abdomen was prepped and draped in usual sterile fashion using chlorhexidine solution.  Local anesthesia using 1% lidocaine with epinephrine was infiltrated in the supraumbilical area.  An incision was made using an 11 blade scalpel.  A Veress needle was placed as the skin was grasped and elevated using towel clamps.  The Veress was confirmed to be intraperitoneal using a saline drop test.  The abdomen was insufflated.  An 8mm robotic trocar was placed.  The abdomen was inspected and there were no adhesions present in the upper abdomen.  An 8 mm robotic trocar was placed in the left lateral abdomen after infiltration of local anesthesia and under direct visualization.   An additional 8 mm robotic trocar as well as a 5 mm assist port was placed in the right abdomen in the same manner.  The patient was then placed in reverse Trendelenburg position and rotated towards the left side.  Robotic cart was advanced into the operative field and appropriately docked.  I then took control of the console.  The fundus of the gallbladder was found to be tensely distended.  The gallbladder was opened in the area of the fundus is electrocautery.  Suction was then used to decompress the gallbladder.  A significant amount of thick purulent bile was expressed.  A small amount of purulent fluid from the gallbladder was spilled into the peritoneum.  This was suctioned and irrigated.  The assistant was able to grasp and elevate the fundus of the gallbladder anteriorly and superiorly.  Dense adhesions were present of the surrounding omentum to the gallbladder.  Blunt dissection and electrocautery where needed was used to free these adhesions.  Juanito's pouch was identified.  The peritoneum surrounding Juanito's pouch was incised.  The cystic duct was identified.  Firefly was not effective due to the acute nature of the gallbladder.  The cystic duct was isolated of surrounding tissue using blunt dissection and electrocautery.  The cystic artery was also identified.  This was freed from surrounding tissue.  Two clips were placed on the cystic duct proximally and 1 distally.  One clip was placed on the cystic artery proximally and 1 distally.  The cystic duct and cystic artery were then divided using sharp dissection.  The gallbladder was then dissected free of the hepatic fossa using electrocautery.  The posterior wall of the gallbladder was gangrenous.  Once the gallbladder was removed was placed within an Endo-Catch bag through the umbilical incision and removed.  The right upper quadrant was then irrigated and suctioned until clean.  The robot was undocked.  Fascia of the umbilical trocar site was  closed using a suture of 0 Vicryl with a laparoscopic suture passer.  All trocars were removed and the abdomen was desufflated.  Skin was closing 4 0 Vicryl in a subcuticular layer.  All incisions were covered with skin glue.  The patient tolerated the procedure well was transferred to recovery in stable condition.  At the end the procedure all needle instrument sponge counts were correct x2.    I was present for the entire procedure., A qualified resident physician was not available., and A physician assistant was required during the procedure for retraction, tissue handling, dissection and suturing.    Patient Disposition:  PACU              SIGNATURE: Irlanda Thompson DO  DATE: April 8, 2025  TIME: 11:05 PM

## 2025-04-09 NOTE — PROGRESS NOTES
Progress Note - Surgery-General   Name: Lee Shepherd 54 y.o. male I MRN: [id number]  Unit/Bed#: -01 I Date of Admission: 4/8/2025   Date of Service: 4/9/2025 I Hospital Day: 1     Assessment & Plan  Emphysematous cholecystitis  54-year-old male a history of hypertension and chronic opioid dependence who is now POD1 s/p robotic assisted laparoscopic cholecystectomy     Intra-op Findings:   - Gangrenous acute cholecystitis with findings consistent with emphysematous cholecystitis. Thick purulent bile within the gallbladder. Critical view obtained.     - Afebrile, VSS on room air since surgery  - Leukocytosis improving 12 (14)  - Hgb 12 (13)  - BMP unremarkable   - AST 55 (78),  (131), Alk Phos 238 (266)  - Tbili 4.75 (4.27)  - Tissue exam pending     - Two doses of Toradol so far today for pain    - PMHx: GERD, T2DM on Ozempic (last dose 2 days ago), kidney stones  - PSHx: Lumbar fusion, cystoscopies    Plan:  - Diet advanced to surgical soft for lunch   - IVF hydration discontinued  - IV abx to be continued - Ceftriaxone, Flagyl, could convert to Augmentin on d/c  - Trend LFTs and Tbili - LFTs noted to be down trending today, but Tbili remains in the 4 range  - Pain/nausea control PRN   - Encourage ambulation   - Continue home meds, insulin sliding scale added  Opioid dependence in remission (HCC)  Suboxone reordered   Secondary hypertension  - home med reordered     Please contact the SecureChat role for the Surgery-General service with any questions/concerns.    Subjective   Patient states he is having some abdominal discomfort in the right side but overall is feeling much better than arrival. He has been tolerating clear liquid diet without any nausea or vomiting and has an appetite for more to eat. Patient denies fevers or chills, shortness of breath, or chest tightness. He has been urinating since surgery. Patient has not yet passed flatus or moved his bowels.     Objective :  Temp:  [97.5 °F  (36.4 °C)-100.4 °F (38 °C)] 97.5 °F (36.4 °C)  HR:  [] 99  BP: (134-144)/(70-87) 134/84  Resp:  [14-19] 18  SpO2:  [87 %-99 %] 95 %  O2 Device: None (Room air)  Nasal Cannula O2 Flow Rate (L/min):  [2 L/min-3 L/min] 3 L/min    I/O         04/07 0701 04/08 0700 04/08 0701 04/09 0700 04/09 0701  04/10 0700    P.O.   480    I.V. (mL/kg)  1400 (10.5)     IV Piggyback  1200 50    Total Intake(mL/kg)  2600 (19.5) 530 (4)    Urine (mL/kg/hr)  650 600 (1.1)    Emesis/NG output  100     Total Output  750 600    Net  +1850 -70                 Physical Exam  General: no acute distress, pt appears well and comfortable, resting in bed   Skin: warm and dry to touch  Pulmonary: normal effort  Abdominal: soft, non-distended abdomen, mild tenderness to palpation over right side of abdomen, most significant in RUQ and overlying incision sites, no guarding or rebound, 4 incision sites are noted without any drainage or bleeding, glue in place, no surrounding erythema or ecchymosis  Neuro: alert and oriented     Lab Results: I have reviewed the following results:  Recent Labs     04/08/25  1819 04/09/25  0509   WBC 14.30* 12.64*   HGB 13.7 12.5   HCT 42.7 39.4    181   BANDSPCT  --  1   SODIUM 135 135   K 3.8 3.9   CL 99 102   CO2 27 25   BUN 11 10   CREATININE 0.61 0.61   GLUC 155* 189*   AST 78* 55*   * 102*   ALB 4.2 3.7   TBILI 4.27* 4.75*   ALKPHOS 266* 238*   HSTNI0 6  --    LACTICACID 1.5  --      Imaging Results Review: No pertinent imaging studies reviewed.  Other Study Results Review: No additional pertinent studies reviewed.    VTE Pharmacologic Prophylaxis: VTE covered by:  heparin (porcine), Subcutaneous, 5,000 Units at 04/09/25 0617     VTE Mechanical Prophylaxis: sequential compression device    Katrina Elizabeth Billig, PA-C  4/9/2025

## 2025-04-09 NOTE — PLAN OF CARE
Problem: Potential for Falls  Goal: Patient will remain free of falls  Description: INTERVENTIONS:- Educate patient/family on patient safety including physical limitations- Instruct patient to call for assistance with activity - Consult OT/PT to assist with strengthening/mobility - Keep Call bell within reach- Keep bed low and locked with side rails adjusted as appropriate- Keep care items and personal belongings within reach- Initiate and maintain comfort rounds- Make Fall Risk Sign visible to staff- Offer Toileting every 2 Hours, in advance of need- Initiate/Maintain bed alarm- Obtain necessary fall risk management equipment: non skid socks- Apply yellow socks and bracelet for high fall risk patients- Consider moving patient to room near nurses station  INTERVENTIONS:- Educate patient/family on patient safety including physical limitations- Instruct patient to call for assistance with activity - Consult OT/PT to assist with strengthening/mobility - Keep Call bell within reach- Keep bed low and locked with side rails adjusted as appropriate- Keep care items and personal belongings within reach- Initiate and maintain comfort rounds- Make Fall Risk Sign visible to staff- Offer Toileting every 2 Hours, in advance of need- Initiate/Maintain bed alarm- Obtain necessary fall risk management equipment: non skid socks- Apply yellow socks and bracelet for high fall risk patients- Consider moving patient to room near nurses station  Outcome: Progressing     Problem: PAIN - ADULT  Goal: Verbalizes/displays adequate comfort level or baseline comfort level  Description: Interventions:- Encourage patient to monitor pain and request assistance- Assess pain using appropriate pain scale- Administer analgesics based on type and severity of pain and evaluate response- Implement non-pharmacological measures as appropriate and evaluate response- Consider cultural and social influences on pain and pain management- Notify  physician/advanced practitioner if interventions unsuccessful or patient reports new pain  Outcome: Progressing     Problem: INFECTION - ADULT  Goal: Absence or prevention of progression during hospitalization  Description: INTERVENTIONS:- Assess and monitor for signs and symptoms of infection- Monitor lab/diagnostic results- Monitor all insertion sites, i.e. indwelling lines, tubes, and drains- Monitor endotracheal if appropriate and nasal secretions for changes in amount and color- [place] appropriate cooling/warming therapies per order- Administer medications as ordered- Instruct and encourage patient and family to use good hand hygiene technique- Identify and instruct in appropriate isolation precautions for identified infection/condition  Outcome: Progressing  Goal: Absence of fever/infection during neutropenic period  Description: INTERVENTIONS:- Monitor WBC  Outcome: Progressing     Problem: SAFETY ADULT  Goal: Patient will remain free of falls  Description: INTERVENTIONS:- Educate patient/family on patient safety including physical limitations- Instruct patient to call for assistance with activity - Consult OT/PT to assist with strengthening/mobility - Keep Call bell within reach- Keep bed low and locked with side rails adjusted as appropriate- Keep care items and personal belongings within reach- Initiate and maintain comfort rounds- Make Fall Risk Sign visible to staff- Offer Toileting every 2 Hours, in advance of need- Initiate/Maintain bed alarm- Obtain necessary fall risk management equipment: non skid socks- Apply yellow socks and bracelet for high fall risk patients- Consider moving patient to room near nurses station  INTERVENTIONS:- Educate patient/family on patient safety including physical limitations- Instruct patient to call for assistance with activity - Consult OT/PT to assist with strengthening/mobility - Keep Call bell within reach- Keep bed low and locked with side rails adjusted as  appropriate- Keep care items and personal belongings within reach- Initiate and maintain comfort rounds- Make Fall Risk Sign visible to staff- Offer Toileting every 2 Hours, in advance of need- Initiate/Maintain bed alarm- Obtain necessary fall risk management equipment: non skid socks- Apply yellow socks and bracelet for high fall risk patients- Consider moving patient to room near nurses station  Outcome: Progressing  Goal: Maintain or return to baseline ADL function  Description: INTERVENTIONS:-  Assess patient's ability to carry out ADLs; assess patient's baseline for ADL function and identify physical deficits which impact ability to perform ADLs (bathing, care of mouth/teeth, toileting, grooming, dressing, etc.)- Assess/evaluate cause of self-care deficits - Assess range of motion- Assess patient's mobility; develop plan if impaired- Assess patient's need for assistive devices and provide as appropriate- Encourage maximum independence but intervene and supervise when necessary- Involve family in performance of ADLs- Assess for home care needs following discharge - Consider OT consult to assist with ADL evaluation and planning for discharge- Provide patient education as appropriate  Outcome: Progressing  Goal: Maintains/Returns to pre admission functional level  Description: INTERVENTIONS:- Perform AM-PAC 6 Click Basic Mobility/ Daily Activity assessment daily.- Set and communicate daily mobility goal to care team and patient/family/caregiver. - Collaborate with rehabilitation services on mobility goals if consulted- Perform Range of Motion 3 times a day.- Reposition patient every 2 hours.- Dangle patient 3 times a day- Stand patient 3 times a day- Ambulate patient 3 times a day- Out of bed to chair 3 times a day - Out of bed for meals 3 times a day- Out of bed for toileting- Record patient progress and toleration of activity level   Outcome: Progressing     Problem: DISCHARGE PLANNING  Goal: Discharge to home  or other facility with appropriate resources  Description: INTERVENTIONS:- Identify barriers to discharge w/patient and caregiver- Arrange for needed discharge resources and transportation as appropriate- Identify discharge learning needs (meds, wound care, etc.)- Arrange for interpretive services to assist at discharge as needed- Refer to Case Management Department for coordinating discharge planning if the patient needs post-hospital services based on physician/advanced practitioner order or complex needs related to functional status, cognitive ability, or social support system  Outcome: Progressing     Problem: Knowledge Deficit  Goal: Patient/family/caregiver demonstrates understanding of disease process, treatment plan, medications, and discharge instructions  Description: Complete learning assessment and assess knowledge base.Interventions:- Provide teaching at level of understanding- Provide teaching via preferred learning methods  Outcome: Progressing

## 2025-04-10 VITALS
WEIGHT: 292.33 LBS | BODY MASS INDEX: 40.93 KG/M2 | DIASTOLIC BLOOD PRESSURE: 78 MMHG | RESPIRATION RATE: 18 BRPM | OXYGEN SATURATION: 96 % | HEART RATE: 100 BPM | SYSTOLIC BLOOD PRESSURE: 126 MMHG | TEMPERATURE: 98.8 F | HEIGHT: 71 IN

## 2025-04-10 PROBLEM — K81.0 EMPHYSEMATOUS CHOLECYSTITIS: Status: RESOLVED | Noted: 2025-04-08 | Resolved: 2025-04-10

## 2025-04-10 LAB
ALBUMIN SERPL BCG-MCNC: 3.5 G/DL (ref 3.5–5)
ALP SERPL-CCNC: 221 U/L (ref 34–104)
ALT SERPL W P-5'-P-CCNC: 81 U/L (ref 7–52)
ANION GAP SERPL CALCULATED.3IONS-SCNC: 5 MMOL/L (ref 4–13)
AST SERPL W P-5'-P-CCNC: 38 U/L (ref 13–39)
BILIRUB SERPL-MCNC: 1.9 MG/DL (ref 0.2–1)
BUN SERPL-MCNC: 14 MG/DL (ref 5–25)
CALCIUM SERPL-MCNC: 8.7 MG/DL (ref 8.4–10.2)
CHLORIDE SERPL-SCNC: 102 MMOL/L (ref 96–108)
CO2 SERPL-SCNC: 29 MMOL/L (ref 21–32)
CREAT SERPL-MCNC: 0.6 MG/DL (ref 0.6–1.3)
GFR SERPL CREATININE-BSD FRML MDRD: 114 ML/MIN/1.73SQ M
GLUCOSE SERPL-MCNC: 137 MG/DL (ref 65–140)
GLUCOSE SERPL-MCNC: 140 MG/DL (ref 65–140)
GLUCOSE SERPL-MCNC: 227 MG/DL (ref 65–140)
POTASSIUM SERPL-SCNC: 4.1 MMOL/L (ref 3.5–5.3)
PROT SERPL-MCNC: 6.4 G/DL (ref 6.4–8.4)
SODIUM SERPL-SCNC: 136 MMOL/L (ref 135–147)

## 2025-04-10 PROCEDURE — 82948 REAGENT STRIP/BLOOD GLUCOSE: CPT

## 2025-04-10 PROCEDURE — 80053 COMPREHEN METABOLIC PANEL: CPT

## 2025-04-10 RX ORDER — PANTOPRAZOLE SODIUM 20 MG/1
20 TABLET, DELAYED RELEASE ORAL
Start: 2025-04-11

## 2025-04-10 RX ORDER — LEVOFLOXACIN 750 MG/1
750 TABLET, FILM COATED ORAL EVERY 24 HOURS
Qty: 7 TABLET | Refills: 0 | Status: SHIPPED | OUTPATIENT
Start: 2025-04-10 | End: 2025-04-17

## 2025-04-10 RX ORDER — METRONIDAZOLE 500 MG/1
500 TABLET ORAL EVERY 8 HOURS SCHEDULED
Qty: 21 TABLET | Refills: 0 | Status: SHIPPED | OUTPATIENT
Start: 2025-04-10 | End: 2025-04-17

## 2025-04-10 RX ORDER — PANTOPRAZOLE SODIUM 20 MG/1
20 TABLET, DELAYED RELEASE ORAL
Status: DISCONTINUED | OUTPATIENT
Start: 2025-04-10 | End: 2025-04-10 | Stop reason: HOSPADM

## 2025-04-10 RX ADMIN — LISINOPRIL 20 MG: 20 TABLET ORAL at 08:26

## 2025-04-10 RX ADMIN — PANTOPRAZOLE SODIUM 20 MG: 20 TABLET, DELAYED RELEASE ORAL at 08:26

## 2025-04-10 RX ADMIN — CEFTRIAXONE 1000 MG: 1 INJECTION, SOLUTION INTRAVENOUS at 08:36

## 2025-04-10 RX ADMIN — BUPRENORPHINE AND NALOXONE 8 MG: 8; 2 FILM BUCCAL; SUBLINGUAL at 08:30

## 2025-04-10 RX ADMIN — INSULIN LISPRO 2 UNITS: 100 INJECTION, SOLUTION INTRAVENOUS; SUBCUTANEOUS at 11:26

## 2025-04-10 RX ADMIN — HEPARIN SODIUM 5000 UNITS: 5000 INJECTION INTRAVENOUS; SUBCUTANEOUS at 05:30

## 2025-04-10 RX ADMIN — KETOROLAC TROMETHAMINE 15 MG: 30 INJECTION, SOLUTION INTRAMUSCULAR; INTRAVENOUS at 08:40

## 2025-04-10 RX ADMIN — HYDROCHLOROTHIAZIDE 12.5 MG: 12.5 TABLET ORAL at 08:26

## 2025-04-10 NOTE — PLAN OF CARE
Problem: Potential for Falls  Goal: Patient will remain free of falls  Description: INTERVENTIONS:- Educate patient/family on patient safety including physical limitations- Instruct patient to call for assistance with activity - Consult OT/PT to assist with strengthening/mobility - Keep Call bell within reach- Keep bed low and locked with side rails adjusted as appropriate- Keep care items and personal belongings within reach- Initiate and maintain comfort rounds- Make Fall Risk Sign visible to staff- Offer Toileting every 2 Hours, in advance of need-pply yellow socks and bracelet for high fall risk patients- Consider moving patient to room near nurses station  INTERVENTIONS:- Educate patient/family on patient safety including physical limitations- Instruct patient to call for assistance with activity - Consult OT/PT to assist with strengthening/mobility - Keep Call bell within reach- Keep bed low and locked with side rails adjusted as appropriate- Keep care items and personal belongings within reach- Initiate and maintain comfort rounds- Make Fall Risk Sign visible to staff- pply yellow socks and bracelet for high fall risk patients- Consider moving patient to room near nurses station  Outcome: Progressing     Problem: PAIN - ADULT  Goal: Verbalizes/displays adequate comfort level or baseline comfort level  Description: Interventions:- Encourage patient to monitor pain and request assistance- Assess pain using appropriate pain scale- Administer analgesics based on type and severity of pain and evaluate response- Implement non-pharmacological measures as appropriate and evaluate response- Consider cultural and social influences on pain and pain management- Notify physician/advanced practitioner if interventions unsuccessful or patient reports new pain  Outcome: Progressing       Problem: INFECTION - ADULT  Goal: Absence or prevention of progression during hospitalization  Description: INTERVENTIONS:- Assess and  monitor for signs and symptoms of infection- Monitor lab/diagnostic results- Monitor all insertion sites, i.e. indwelling lines, tubes, and drains- Monitor endotracheal if appropriate and nasal secretions for changes in amount and color- [place] appropriate cooling/warming therapies per order- Administer medications as ordered- Instruct and encourage patient and family to use good hand hygiene technique- Identify and instruct in appropriate isolation precautions for identified infection/condition  Outcome: Progressing

## 2025-04-10 NOTE — DISCHARGE SUMMARY
Discharge Summary - Surgery-General   Name: Lee Shepherd 54 y.o. 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 male who presents with worsening right upper quadrant abdominal pain.  The patient was evaluated in the emergency room yesterday.  At that time, his right upper quadrant pain resolved with 1 dose of Toradol.  The patient was able to be discharged home.  The patient states his pain acutely worsened earlier this afternoon.  He has only been able to tolerate small sips of water and a small piece of granola bar around noon today.  He has had nausea but no vomiting.  He denies any fever or chills..       Procedures Performed:   Orders Placed This Encounter   Procedures    ED ECG Documentation Only   04/08/2025:  Robotic assisted laparoscopic cholecystectomy by Dr. Thompson    Summary of Hospital Course: Patient was admitted due to worsening right upper quadrant pain, found to have emphysematous acute cholecystitis.  He was then taken immediately to the operating room where he underwent robotic assisted laparoscopic cholecystectomy by Dr. Thompson.  Findings IntraOp gangrenous acute cholecystitis with findings consistent with emphysematous cholecystitis.  Thick purulent bile within the gallbladder.  Critical view obtained.  Postop day 1 patient had improving leukocytosis, afebrile, stable hemoglobin, with persistently  elevated total bilirubin of 4.7 (4.27 preop).  Pain was well-controlled, diet was advanced to surgical soft, only requiring Toradol for pain without any additional concerns.  Postop day 2 patient was seen and examined with no fevers, chills, nausea, or vomiting.  Did have some minimal reflux for which she was started back on his home Protonix.  Tolerating diet without any issues.  Total bilirubin fortunately continue to trend down to 1.9.  Pain was controlled without any additional pain medicine beside his home Suboxone.  Ambulating independently, urinating without any concerns.  Denies any flatus or BM, but abdomen soft, with positive bowel sounds.    He was sent home on 7 days of flagyl and levaquin which he requested by sent to [facility].     Significant Findings, Care, Treatment and Services Provided:   04/08/2025:  Robotic assisted laparoscopic cholecystectomy by Dr. Thompson    Complications: none    Condition at Discharge: good       Discharge instructions/Information to patient and family:   See After Visit Summary (AVS) for information provided to patient and family.      Provisions for Follow-Up Care:  See after visit summary for information related to follow-up care and any pertinent home health orders.      PCP: No primary care provider on file.    Disposition: Home    Planned Readmission: No     Discharge Medications:  See after visit summary for reconciled discharge medications provided to patient and family.      Discharge Statement:  I have spent a total time of 30 minutes in caring for this patient on the day of the visit/encounter. >30 minutes of time was spent on: Diagnostic results, Prognosis, Risks and benefits of tx options, Instructions for management, Patient and family education, Importance of tx compliance, Risk factor reductions, Impressions, Counseling / Coordination of care, Documenting in the medical record, Reviewing / ordering tests, medicine, procedures  , and Communicating with  other healthcare professionals .

## 2025-04-10 NOTE — PROGRESS NOTES
Progress Note - Surgery-General   Name: Lee Shepherd 54 y.o. male I MRN: [id number]  Unit/Bed#: -01 I Date of Admission: 4/8/2025   Date of Service: 4/10/2025 I Hospital Day: 2    Assessment & Plan  Emphysematous cholecystitis  54-year-old male a history of hypertension and chronic opioid dependence who is now POD2 s/p robotic assisted laparoscopic cholecystectomy     Intra-op Findings:   - Gangrenous acute cholecystitis with findings consistent with emphysematous cholecystitis. Thick purulent bile within the gallbladder. Critical view obtained.     - Afebrile, VSS on room air since surgery    - Leukocytosis improving from cbc 04/9 12 (14)  - Hgb 12 (13) on cbc 04/09  - BMP unremarkable   - AST 38 (55, 78), ALT 81 (102, 131), Alk Phos 221 (238, 266)  - Tbili 1.90 (4.75, 4.27)  - Tissue exam pending     - no pain medication today    - PMHx: GERD, T2DM on Ozempic (last dose 2 days ago), kidney stones  - PSHx: Lumbar fusion, cystoscopies    Plan:  - tolerating surgical soft diet  - IV abx to be continued - Ceftriaxone, Flagyl, could convert to Augmentin on d/c, which she requests being sent to the [facility] pharmacy which will overnight medication to him  - Trend LFTs and Tbili - LFTs noted to be down trending today  - Pain/nausea control PRN   - Encourage ambulation   - Continue home meds  - Anticipate discharge home later today  -Discussed return precautions, and we will see him in the office in approximately 2 weeks for routine follow-up  Opioid dependence in remission (HCC)  Suboxone reordered   Secondary hypertension  - home med reordered     Please contact the SecureChat role for the Surgery-General service with any questions/concerns.    Subjective   Patient is seen in bed.  He reports that he has not slept since he has been here.  He is hoping to be able to go home later today.  He says that he is still having some mild incisional pain, and some very mild right upper quadrant pain.  Denies any nausea  or vomiting, but is having reflux symptoms for which he typically takes Protonix at home but has not been taking here.  Urinating frequently without any concerns, says he has not passed any gas yet since surgery.  Tolerating diet without any nausea vomiting or increased pain.    Objective :  Temp:  [97.3 °F (36.3 °C)-98.6 °F (37 °C)] 97.3 °F (36.3 °C)  HR:  [85-89] 85  BP: (126-128)/(74-80) 128/77  Resp:  [18] 18  SpO2:  [93 %-96 %] 96 %  O2 Device: None (Room air)    I/O         04/08 0701  04/09 0700 04/09 0701  04/10 0700 04/10 0701  04/11 0700    P.O.  600     I.V. (mL/kg) 1400 (10.5)      IV Piggyback 1200 50     Total Intake(mL/kg) 2600 (19.5) 650 (4.9)     Urine (mL/kg/hr) 650 1100 (0.3)     Emesis/NG output 100      Total Output 750 1100     Net +1850 -450            Unmeasured Urine Occurrence  1100 x             Physical Exam  Vitals reviewed.   Constitutional:       General: He is not in acute distress.     Appearance: Normal appearance. He is not ill-appearing, toxic-appearing or diaphoretic.   HENT:      Head: Normocephalic and atraumatic.      Mouth/Throat:      Mouth: Mucous membranes are moist.      Pharynx: No oropharyngeal exudate.   Eyes:      General: No scleral icterus.  Cardiovascular:      Rate and Rhythm: Normal rate.      Heart sounds: Normal heart sounds.   Pulmonary:      Effort: Pulmonary effort is normal. No respiratory distress.      Breath sounds: Rhonchi present.      Comments: Rhonchi at left base which cleared with cough, breath sounds otherwise clear throughout all lung fields  Abdominal:      General: Bowel sounds are normal. There is no distension.      Palpations: Abdomen is soft. There is no mass.      Tenderness: There is abdominal tenderness. There is no guarding or rebound.      Hernia: No hernia is present.      Comments: Abdomen soft, mildly tender at incisions as expected, positive bowel sounds, incisions well-approximated with surgical glue without erythema or drainage    Musculoskeletal:         General: Swelling present. Normal range of motion.      Comments: +2 edema right lower extremity +1 edema left lower extremity, per patient this is baseline   Skin:     General: Skin is warm and dry.      Coloration: Skin is not jaundiced.   Neurological:      General: No focal deficit present.      Mental Status: He is alert.   Psychiatric:         Mood and Affect: Mood normal.         Lab Results: I have reviewed the following results:  Recent Labs     04/08/25  1819 04/09/25  0509 04/10/25  0526   WBC 14.30* 12.64*  --    HGB 13.7 12.5  --    HCT 42.7 39.4  --     181  --    BANDSPCT  --  1  --    SODIUM 135 135 136   K 3.8 3.9 4.1   CL 99 102 102   CO2 27 25 29   BUN 11 10 14   CREATININE 0.61 0.61 0.60   GLUC 155* 189* 140   AST 78* 55* 38   * 102* 81*   ALB 4.2 3.7 3.5   TBILI 4.27* 4.75* 1.90*   ALKPHOS 266* 238* 221*   HSTNI0 6  --   --    LACTICACID 1.5  --   --        Imaging Results Review: No pertinent imaging studies reviewed.  Other Study Results Review: No additional pertinent studies reviewed.    VTE Pharmacologic Prophylaxis: VTE covered by:  heparin (porcine), Subcutaneous, 5,000 Units at 04/10/25 0530     VTE Mechanical Prophylaxis: sequential compression device

## 2025-04-10 NOTE — DISCHARGE INSTR - AVS FIRST PAGE
Laparoscopic Cholecystectomy   WHAT YOU NEED TO KNOW:   Laparoscopic cholecystectomy is surgery to remove your gallbladder.     DISCHARGE INSTRUCTIONS:   Call Dr. Thompson's office at [phone] with any questions or concerns    Medicines:  You may need any of the following:  Prescription pain medicine - Take as directed    You may also take tylenol as needed    Take your medicine as directed.  Contact your healthcare provider if you think your medicine is not helping or if you have side effects. Tell her if you are allergic to any medicine. Keep a list of the medicines, vitamins, and herbs you take. Include the amounts, and when and why you take them. Bring the list or the pill bottles to follow-up visits. Carry your medicine list with you in case of an emergency.    Follow up with your healthcare provider 2 weeks after surgery, or as directed:  Write down your questions so you remember to ask them during your visits.  Wound care:  You may shower and pat the incisions dry.  Do not soak underwater for 2 weeks   What to eat after surgery:  You may eat whatever you feel up to following surgery.   You may notice diarrhea with fatty foods. Drink plenty of liquids.   When to return to work and other activities:  No lifting, pushing, or pulling greater then 10lb for 2 weeks.  Walk as much as possible.  YOU may drive when you are comfortable enough to turn and press the brake without pain medication    Contact your healthcare provider if:   You have a fever over 101°F (38°C) or chills.     You have pain or nausea that is not relieved by medicine.     You have redness and swelling around your incisions, or blood or pus is leaking from your incisions.     You are constipated or have diarrhea.     Your skin or eyes are yellow, or your bowel movements are pale.     You have questions or concerns about your surgery, condition, or care.  Seek care immediately or call 911 if:   You cannot stop vomiting.     Your bowel  movements are black or bloody.     You have pain in your abdomen and it is swollen or hard.     Your arm or leg feels warm, tender, and painful. It may look swollen and red.    You feel lightheaded, short of breath, and have chest pain.     You cough up blood.  © 2017 [employer] Information is for End User's use only and may not be sold, redistributed or otherwise used for commercial purposes. All illustrations and images included in CareNotes® are the copyrighted property of [employer] or [employer].  The above information is an  only. It is not intended as medical advice for individual conditions or treatments. Talk to your doctor, nurse or pharmacist before following any medical regimen to see if it is safe and effective for you.       Complete the entire course of antibiotics as prescribed.

## 2025-04-10 NOTE — ASSESSMENT & PLAN NOTE
54-year-old male a history of hypertension and chronic opioid dependence who is now POD2 s/p robotic assisted laparoscopic cholecystectomy     Intra-op Findings:   - Gangrenous acute cholecystitis with findings consistent with emphysematous cholecystitis. Thick purulent bile within the gallbladder. Critical view obtained.     - Afebrile, VSS on room air since surgery    - Leukocytosis improving from cbc 04/9 12 (14)  - Hgb 12 (13) on cbc 04/09  - BMP unremarkable   - AST 38 (55, 78), ALT 81 (102, 131), Alk Phos 221 (238, 266)  - Tbili 1.90 (4.75, 4.27)  - Tissue exam pending     - no pain medication today    - PMHx: GERD, T2DM on Ozempic (last dose 2 days ago), kidney stones  - PSHx: Lumbar fusion, cystoscopies    Plan:  - tolerating surgical soft diet  - IV abx to be continued - Ceftriaxone, Flagyl, could convert to Augmentin on d/c, which she requests being sent to the [facility] pharmacy which will overnight medication to him  - Trend LFTs and Tbili - LFTs noted to be down trending today  - Pain/nausea control PRN   - Encourage ambulation   - Continue home meds  - Anticipate discharge home later today  -Discussed return precautions, and we will see him in the office in approximately 2 weeks for routine follow-up

## 2025-04-10 NOTE — NURSING NOTE
Reviewed discharge instructions, med rec, follow up appointments, wound care, worsening s/s when to call PCP or return to ER verbally understood reviewed incentive spirometer to prevent post op complications verbally understood

## 2025-04-11 PROCEDURE — 88304 TISSUE EXAM BY PATHOLOGIST: CPT | Performed by: STUDENT IN AN ORGANIZED HEALTH CARE EDUCATION/TRAINING PROGRAM

## 2025-04-11 NOTE — ANESTHESIA POSTPROCEDURE EVALUATION
Post-Op Assessment Note    CV Status:  Stable    Pain management: adequate       Mental Status:  Alert and awake   Hydration Status:  Euvolemic   PONV Controlled:  Controlled   Airway Patency:  Patent     Post Op Vitals Reviewed: Yes    No anethesia notable event occurred.    Staff: Anesthesiologist           Last Filed PACU Vitals:  Vitals Value Taken Time   Temp 99.3 °F (37.4 °C) 04/09/25 0000   Pulse 102 04/09/25 0000   /70 04/09/25 0000   Resp 16 04/09/25 0000   SpO2 92 % 04/09/25 0000       Modified Ramiro:     Vitals Value Taken Time   Activity 2 04/09/25 0000   Respiration 2 04/09/25 0000   Circulation 2 04/09/25 0000   Consciousness 1 04/09/25 0000   Oxygen Saturation 1 04/09/25 0000     Modified Ramiro Score: 8

## 2025-04-11 NOTE — QUICK NOTE
Sepsis POA due to acute gangrenous cholecystitis, a/e/b tachycardia (/108) and Leukocytosis (WBC 14.30/12.64) treated with IV Cefazolin, Zosyn, Flagyl and emergent Cholecystectomy.

## (undated) DEVICE — REM POLYHESIVE ADULT PATIENT RETURN ELECTRODE: Brand: VALLEYLAB

## (undated) DEVICE — POTTS SCISSORS: Brand: ENDOWRIST

## (undated) DEVICE — SPECIMEN CONTAINER STERILE PEEL PACK

## (undated) DEVICE — INTENDED FOR TISSUE SEPARATION, AND OTHER PROCEDURES THAT REQUIRE A SHARP SURGICAL BLADE TO PUNCTURE OR CUT.: Brand: BARD-PARKER SAFETY BLADES SIZE 11, STERILE

## (undated) DEVICE — SCD SEQUENTIAL COMPRESSION COMFORT SLEEVE MEDIUM KNEE LENGTH: Brand: KENDALL SCD

## (undated) DEVICE — GLOVE SRG BIOGEL 6

## (undated) DEVICE — LUBRICANT SURGILUBE TUBE 4 OZ  FLIP TOP

## (undated) DEVICE — COLUMN DRAPE

## (undated) DEVICE — STERILE CYSTO PACK: Brand: CARDINAL HEALTH

## (undated) DEVICE — SUCTION IRRIGATOR: Brand: ENDOWRIST

## (undated) DEVICE — BLADELESS OBTURATOR: Brand: WECK VISTA

## (undated) DEVICE — CURVED BIPOLAR DISSECTOR: Brand: ENDOWRIST;DAVINCI SI

## (undated) DEVICE — TUBING SUCTION 5MM X 12 FT

## (undated) DEVICE — HEM-O-LOK CLIP CARTRIDGE MED/LARGE DA VINCI SI/XI

## (undated) DEVICE — SYRINGE 10ML LL

## (undated) DEVICE — ELECTRO LUBE IS A SINGLE PATIENT USE DEVICE THAT IS INTENDED TO BE USED ON ELECTROSURGICAL ELECTRODES TO REDUCE STICKING.: Brand: KEY SURGICAL ELECTRO LUBE

## (undated) DEVICE — DECANTER: Brand: UNBRANDED

## (undated) DEVICE — ALLENTOWN LAP CHOLE APP PACK: Brand: CARDINAL HEALTH

## (undated) DEVICE — TROCAR: Brand: KII FIOS FIRST ENTRY

## (undated) DEVICE — EXOFIN PRECISION PEN HIGH VISCOSITY TOPICAL SKIN ADHESIVE: Brand: EXOFIN PRECISION PEN, 1G

## (undated) DEVICE — GLOVE INDICATOR PI UNDERGLOVE SZ 6.5 BLUE

## (undated) DEVICE — PACK TUR

## (undated) DEVICE — TUBING INSUFFLATION DISP NON-HEATED

## (undated) DEVICE — PERMANENT CAUTERY HOOK: Brand: ENDOWRIST

## (undated) DEVICE — CATH URETERAL 5FR X 70 CM FLEX TIP POLYUR BARD

## (undated) DEVICE — SEAL

## (undated) DEVICE — STANDARD SURGICAL GOWN, L: Brand: CONVERTORS

## (undated) DEVICE — SUT VICRYL PLUS 0 54IN VCP287G

## (undated) DEVICE — INSUFFLATION NEEDLE TO ESTABLISH PNEUMOPERITONEUM.: Brand: INSUFFLATION NEEDLE

## (undated) DEVICE — 40595 XL TRENDELENBURG POSITIONING KIT: Brand: 40595 XL TRENDELENBURG POSITIONING KIT

## (undated) DEVICE — GLOVE SRG BIOGEL 7.5

## (undated) DEVICE — GUIDEWIRE STRGHT TIP 0.035 IN  SOLO PLUS

## (undated) DEVICE — MAYO STAND COVER: Brand: CONVERTORS

## (undated) DEVICE — CATH FLEXI TIP URETERAL 5FR OPEN END

## (undated) DEVICE — CATH BAL DIL 6FR 6MM 4CM PERC X-FRC U30 WO INFL DEV

## (undated) DEVICE — VISUALIZATION SYSTEM: Brand: CLEARIFY

## (undated) DEVICE — CHLORAPREP HI-LITE 26ML ORANGE

## (undated) DEVICE — TUBING SMOKE EVAC W/FILTRATION DEVICE PLUMEPORT ACTIV

## (undated) DEVICE — TISSUE RETRIEVAL SYSTEM: Brand: INZII RETRIEVAL SYSTEM

## (undated) DEVICE — ANTIBACTERIAL UNDYED BRAIDED (POLYGLACTIN 910), SYNTHETIC ABSORBABLE SUTURE: Brand: COATED VICRYL

## (undated) DEVICE — MEDIUM-LARGE CLIP APPLIER: Brand: ENDOWRIST

## (undated) DEVICE — SYRINGE 3ML LL

## (undated) DEVICE — PENCIL ELECTROSURG E-Z CLEAN -0035H

## (undated) DEVICE — TIP COVER ACCESSORY

## (undated) DEVICE — ASTOUND FABRIC REINFORCED SURGICAL GOWN: Brand: CONVERTORS

## (undated) DEVICE — TOWEL SURG XR DETECT GREEN STRL RFD

## (undated) DEVICE — ARM DRAPE

## (undated) DEVICE — DRAPE EQUIPMENT RF WAND